# Patient Record
Sex: FEMALE | Race: WHITE | NOT HISPANIC OR LATINO
[De-identification: names, ages, dates, MRNs, and addresses within clinical notes are randomized per-mention and may not be internally consistent; named-entity substitution may affect disease eponyms.]

---

## 2022-12-27 PROBLEM — Z00.00 ENCOUNTER FOR PREVENTIVE HEALTH EXAMINATION: Status: ACTIVE | Noted: 2022-12-27

## 2023-01-03 ENCOUNTER — APPOINTMENT (OUTPATIENT)
Dept: OBGYN | Facility: CLINIC | Age: 28
End: 2023-01-03
Payer: COMMERCIAL

## 2023-01-03 ENCOUNTER — LABORATORY RESULT (OUTPATIENT)
Age: 28
End: 2023-01-03

## 2023-01-03 VITALS
BODY MASS INDEX: 41.22 KG/M2 | HEIGHT: 62 IN | DIASTOLIC BLOOD PRESSURE: 78 MMHG | WEIGHT: 224 LBS | SYSTOLIC BLOOD PRESSURE: 121 MMHG

## 2023-01-03 DIAGNOSIS — Z82.49 FAMILY HISTORY OF ISCHEMIC HEART DISEASE AND OTHER DISEASES OF THE CIRCULATORY SYSTEM: ICD-10-CM

## 2023-01-03 DIAGNOSIS — Z83.79 FAMILY HISTORY OF OTHER DISEASES OF THE DIGESTIVE SYSTEM: ICD-10-CM

## 2023-01-03 PROCEDURE — 36415 COLL VENOUS BLD VENIPUNCTURE: CPT

## 2023-01-03 PROCEDURE — 99202 OFFICE O/P NEW SF 15 MIN: CPT

## 2023-01-03 NOTE — PLAN
[FreeTextEntry1] : 27 year old presenting with amenorrhea \par -f/u PAP and GC/CT done today\par -f/u prenatal blood work drawn today\par -Rx sent for folic acid \par -RTO 2 weeks for first trimester ultrasound and review of prenatal lab results

## 2023-01-03 NOTE — HISTORY OF PRESENT ILLNESS
[FreeTextEntry1] : Patient is a 27 year old who presents after she had a positive home pregnancy test. LMP 11/10/22. She is endorsing occasional nausea and breast tenderness. She is taking OTC PNV that she is happy with. \par  [HIV Test offered] : HIV Test offered [Syphilis test offered] : Syphilis test offered [Gonorrhea test offered] : Gonorrhea test offered [Chlamydia test offered] : Chlamydia test offered

## 2023-01-04 ENCOUNTER — NON-APPOINTMENT (OUTPATIENT)
Age: 28
End: 2023-01-04

## 2023-01-04 LAB
ABO + RH PNL BLD: NORMAL
APPEARANCE: CLEAR
BACTERIA: NEGATIVE
BASOPHILS # BLD AUTO: 0.04 K/UL
BASOPHILS NFR BLD AUTO: 0.4 %
BILIRUBIN URINE: NEGATIVE
BLD GP AB SCN SERPL QL: NORMAL
BLOOD URINE: NEGATIVE
COLOR: YELLOW
EOSINOPHIL # BLD AUTO: 0.21 K/UL
EOSINOPHIL NFR BLD AUTO: 1.9 %
ESTIMATED AVERAGE GLUCOSE: 128 MG/DL
GLUCOSE QUALITATIVE U: NORMAL
GLUCOSE SERPL-MCNC: 83 MG/DL
HBA1C MFR BLD HPLC: 6.1 %
HCT VFR BLD CALC: 39.7 %
HGB A MFR BLD: 97.7 %
HGB A2 MFR BLD: 2.3 %
HGB BLD-MCNC: 13.2 G/DL
HGB FRACT BLD-IMP: NORMAL
HIV1+2 AB SPEC QL IA.RAPID: NONREACTIVE
HPV HIGH+LOW RISK DNA PNL CVX: NOT DETECTED
HYALINE CASTS: 2 /LPF
IMM GRANULOCYTES NFR BLD AUTO: 0.4 %
KETONES URINE: NEGATIVE
LEUKOCYTE ESTERASE URINE: NEGATIVE
LYMPHOCYTES # BLD AUTO: 5.37 K/UL
LYMPHOCYTES NFR BLD AUTO: 49.7 %
MAN DIFF?: NORMAL
MCHC RBC-ENTMCNC: 27.7 PG
MCHC RBC-ENTMCNC: 33.2 GM/DL
MCV RBC AUTO: 83.4 FL
MICROSCOPIC-UA: NORMAL
MONOCYTES # BLD AUTO: 0.81 K/UL
MONOCYTES NFR BLD AUTO: 7.5 %
NEUTROPHILS # BLD AUTO: 4.33 K/UL
NEUTROPHILS NFR BLD AUTO: 40.1 %
NITRITE URINE: NEGATIVE
PH URINE: 6
PLATELET # BLD AUTO: 422 K/UL
PROTEIN URINE: NORMAL
RBC # BLD: 4.76 M/UL
RBC # FLD: 13.5 %
RED BLOOD CELLS URINE: 9 /HPF
SPECIFIC GRAVITY URINE: 1.03
SQUAMOUS EPITHELIAL CELLS: 1 /HPF
UROBILINOGEN URINE: NORMAL
WBC # FLD AUTO: 10.8 K/UL
WHITE BLOOD CELLS URINE: 2 /HPF

## 2023-01-05 LAB
BACTERIA UR CULT: NORMAL
C TRACH RRNA SPEC QL NAA+PROBE: NOT DETECTED
HBV SURFACE AG SER QL: NONREACTIVE
HPV 16 E6+E7 MRNA CVX QL NAA+PROBE: NOT DETECTED
HPV18+45 E6+E7 MRNA CVX QL NAA+PROBE: NOT DETECTED
LEAD BLD-MCNC: <1 UG/DL
MEV IGG FLD QL IA: 14.7 AU/ML
MEV IGG+IGM SER-IMP: NORMAL
MUV AB SER-ACNC: POSITIVE
MUV IGG SER QL IA: 58.7 AU/ML
N GONORRHOEA RRNA SPEC QL NAA+PROBE: NOT DETECTED
RUBV IGG FLD-ACNC: 4.8 INDEX
RUBV IGG SER-IMP: POSITIVE
SOURCE AMPLIFICATION: NORMAL
T PALLIDUM AB SER QL IA: NEGATIVE

## 2023-01-06 LAB
M TB IFN-G BLD-IMP: NEGATIVE
QUANTIFERON TB PLUS MITOGEN MINUS NIL: >10 IU/ML
QUANTIFERON TB PLUS NIL: 0.02 IU/ML
QUANTIFERON TB PLUS TB1 MINUS NIL: 0 IU/ML
QUANTIFERON TB PLUS TB2 MINUS NIL: 0 IU/ML

## 2023-01-08 LAB — AR GENE MUT ANL BLD/T: NORMAL

## 2023-01-11 LAB
CYTOLOGY CVX/VAG DOC THIN PREP: NORMAL
FMR1 GENE MUT ANL BLD/T: NORMAL

## 2023-01-12 LAB — CFTR MUT TESTED BLD/T: NEGATIVE

## 2023-01-18 ENCOUNTER — APPOINTMENT (OUTPATIENT)
Dept: ANTEPARTUM | Facility: CLINIC | Age: 28
End: 2023-01-18
Payer: COMMERCIAL

## 2023-01-18 ENCOUNTER — APPOINTMENT (OUTPATIENT)
Dept: OBGYN | Facility: CLINIC | Age: 28
End: 2023-01-18
Payer: COMMERCIAL

## 2023-01-18 VITALS
WEIGHT: 224 LBS | SYSTOLIC BLOOD PRESSURE: 112 MMHG | DIASTOLIC BLOOD PRESSURE: 74 MMHG | HEIGHT: 62 IN | BODY MASS INDEX: 41.22 KG/M2

## 2023-01-18 PROCEDURE — 76801 OB US < 14 WKS SINGLE FETUS: CPT

## 2023-01-18 PROCEDURE — 0502F SUBSEQUENT PRENATAL CARE: CPT

## 2023-02-01 ENCOUNTER — APPOINTMENT (OUTPATIENT)
Dept: OBGYN | Facility: CLINIC | Age: 28
End: 2023-02-01
Payer: COMMERCIAL

## 2023-02-01 ENCOUNTER — APPOINTMENT (OUTPATIENT)
Dept: ANTEPARTUM | Facility: CLINIC | Age: 28
End: 2023-02-01
Payer: COMMERCIAL

## 2023-02-01 PROCEDURE — 76813 OB US NUCHAL MEAS 1 GEST: CPT

## 2023-02-01 PROCEDURE — 0502F SUBSEQUENT PRENATAL CARE: CPT

## 2023-02-06 LAB
ADDITIONAL US: NORMAL
CRL SCAN TWIN B: NORMAL
CRL SCAN: NORMAL
CROWN RUMP LENGTH TWIN B: NORMAL
CROWN RUMP LENGTH: 59.2 MM
DIA MOM: 0.57
DIA VALUE: 105.6 PG/ML
DOWN SYNDROME AGE RISK: NORMAL
DOWN SYNDROME INTERPRETATION: NORMAL
DOWN SYNDROME SCREENING RISK: NORMAL
FIRST TRIMESTER SCREEN COMMENTS: NORMAL
FIRST TRIMESTER SCREEN NOTE: NORMAL
FIRST TRIMESTER SCREEN RESULTS: NORMAL
FIRST TRIMESTER SCREEN TEST RESULTS: NORMAL
GEST. AGE ON COLLECTION DATE: 12.3 WEEKS
HCG MOM: 1.08
HCG VALUE: 86.3 IU/ML
MATERNAL AGE AT EDD: 29.2 YR
NT MOM TWIN B: NORMAL
NT TWIN B: NORMAL
NUCHAL TRANSLUCENCY (NT): 1.1 MM
NUCHAL TRANSLUCENCY MOM: 0.83
NUMBER OF FETUSES: 1
PAPP-A MOM: 0.62
PAPP-A VALUE: 340.3 NG/ML
RACE: NORMAL
SONOGRAPHER ID#: NORMAL
TRISOMY 18 AGE RISK: NORMAL
TRISOMY 18 INTERPRETATION: NORMAL
TRISOMY 18 SCREENING RISK: NORMAL
WEIGHT AFP: 221 LBS

## 2023-02-23 ENCOUNTER — APPOINTMENT (OUTPATIENT)
Dept: OBGYN | Facility: CLINIC | Age: 28
End: 2023-02-23
Payer: COMMERCIAL

## 2023-02-23 VITALS
WEIGHT: 221 LBS | HEIGHT: 62 IN | DIASTOLIC BLOOD PRESSURE: 85 MMHG | BODY MASS INDEX: 40.67 KG/M2 | SYSTOLIC BLOOD PRESSURE: 123 MMHG

## 2023-02-23 PROCEDURE — 0502F SUBSEQUENT PRENATAL CARE: CPT

## 2023-02-26 LAB
AFP MOM: 1.02
AFP VALUE: 24.7 NG/ML
ALPHA FETOPROTEIN SERUM COMMENT: NORMAL
ALPHA FETOPROTEIN SERUM INTERPRETATION: NORMAL
ALPHA FETOPROTEIN SERUM RESULTS: NORMAL
ALPHA FETOPROTEIN SERUM TEST RESULTS: NORMAL
GESTATIONAL AGE BASED ON: NORMAL
GESTATIONAL AGE ON COLLECTION DATE: 15 WEEKS
INSULIN DEP DIABETES: NO
MATERNAL AGE AT EDD AFP: 29.2 YR
MULTIPLE GESTATION: NO
OSBR RISK 1 IN: NORMAL
RACE: NORMAL
WEIGHT AFP: 221 LBS

## 2023-03-08 ENCOUNTER — APPOINTMENT (OUTPATIENT)
Dept: OBGYN | Facility: CLINIC | Age: 28
End: 2023-03-08
Payer: COMMERCIAL

## 2023-03-08 VITALS — WEIGHT: 219 LBS | DIASTOLIC BLOOD PRESSURE: 80 MMHG | BODY MASS INDEX: 40.06 KG/M2 | SYSTOLIC BLOOD PRESSURE: 115 MMHG

## 2023-03-08 DIAGNOSIS — Z33.1 PREGNANT STATE, INCIDENTAL: ICD-10-CM

## 2023-03-08 PROCEDURE — 0502F SUBSEQUENT PRENATAL CARE: CPT

## 2023-03-22 ENCOUNTER — APPOINTMENT (OUTPATIENT)
Dept: OBGYN | Facility: CLINIC | Age: 28
End: 2023-03-22
Payer: COMMERCIAL

## 2023-03-22 VITALS — DIASTOLIC BLOOD PRESSURE: 83 MMHG | WEIGHT: 218 LBS | SYSTOLIC BLOOD PRESSURE: 116 MMHG | BODY MASS INDEX: 39.87 KG/M2

## 2023-03-22 PROCEDURE — 0502F SUBSEQUENT PRENATAL CARE: CPT

## 2023-04-04 ENCOUNTER — APPOINTMENT (OUTPATIENT)
Dept: PEDIATRIC CARDIOLOGY | Facility: CLINIC | Age: 28
End: 2023-04-04
Payer: COMMERCIAL

## 2023-04-04 PROCEDURE — 99203 OFFICE O/P NEW LOW 30 MIN: CPT | Mod: 25

## 2023-04-04 PROCEDURE — 93325 DOPPLER ECHO COLOR FLOW MAPG: CPT | Mod: 59

## 2023-04-04 PROCEDURE — 76827 ECHO EXAM OF FETAL HEART: CPT

## 2023-04-04 PROCEDURE — 76820 UMBILICAL ARTERY ECHO: CPT

## 2023-04-04 PROCEDURE — 76821 MIDDLE CEREBRAL ARTERY ECHO: CPT

## 2023-04-04 PROCEDURE — 76825 ECHO EXAM OF FETAL HEART: CPT

## 2023-04-12 ENCOUNTER — APPOINTMENT (OUTPATIENT)
Dept: ANTEPARTUM | Facility: CLINIC | Age: 28
End: 2023-04-12
Payer: COMMERCIAL

## 2023-04-12 ENCOUNTER — APPOINTMENT (OUTPATIENT)
Dept: OBGYN | Facility: CLINIC | Age: 28
End: 2023-04-12
Payer: COMMERCIAL

## 2023-04-12 VITALS — BODY MASS INDEX: 40.42 KG/M2 | SYSTOLIC BLOOD PRESSURE: 127 MMHG | DIASTOLIC BLOOD PRESSURE: 82 MMHG | WEIGHT: 221 LBS

## 2023-04-12 PROCEDURE — 0502F SUBSEQUENT PRENATAL CARE: CPT

## 2023-04-12 PROCEDURE — 76811 OB US DETAILED SNGL FETUS: CPT

## 2023-04-28 ENCOUNTER — NON-APPOINTMENT (OUTPATIENT)
Age: 28
End: 2023-04-28

## 2023-05-03 ENCOUNTER — APPOINTMENT (OUTPATIENT)
Dept: OBGYN | Facility: CLINIC | Age: 28
End: 2023-05-03
Payer: COMMERCIAL

## 2023-05-03 VITALS — BODY MASS INDEX: 41.15 KG/M2 | SYSTOLIC BLOOD PRESSURE: 124 MMHG | DIASTOLIC BLOOD PRESSURE: 86 MMHG | WEIGHT: 225 LBS

## 2023-05-03 PROCEDURE — 0502F SUBSEQUENT PRENATAL CARE: CPT

## 2023-05-04 LAB
BASOPHILS # BLD AUTO: 0.05 K/UL
BASOPHILS NFR BLD AUTO: 0.3 %
EOSINOPHIL # BLD AUTO: 0.44 K/UL
EOSINOPHIL NFR BLD AUTO: 3 %
HCT VFR BLD CALC: 35.1 %
HGB BLD-MCNC: 11.4 G/DL
IMM GRANULOCYTES NFR BLD AUTO: 1.3 %
LYMPHOCYTES # BLD AUTO: 4.6 K/UL
LYMPHOCYTES NFR BLD AUTO: 31.8 %
MAN DIFF?: NORMAL
MCHC RBC-ENTMCNC: 27.7 PG
MCHC RBC-ENTMCNC: 32.5 GM/DL
MCV RBC AUTO: 85.4 FL
MONOCYTES # BLD AUTO: 1 K/UL
MONOCYTES NFR BLD AUTO: 6.9 %
NEUTROPHILS # BLD AUTO: 8.2 K/UL
NEUTROPHILS NFR BLD AUTO: 56.7 %
PLATELET # BLD AUTO: 378 K/UL
RBC # BLD: 4.11 M/UL
RBC # FLD: 13.6 %
WBC # FLD AUTO: 14.48 K/UL

## 2023-05-30 ENCOUNTER — APPOINTMENT (OUTPATIENT)
Dept: OBGYN | Facility: CLINIC | Age: 28
End: 2023-05-30
Payer: COMMERCIAL

## 2023-05-30 ENCOUNTER — APPOINTMENT (OUTPATIENT)
Dept: ANTEPARTUM | Facility: CLINIC | Age: 28
End: 2023-05-30
Payer: COMMERCIAL

## 2023-05-30 VITALS — DIASTOLIC BLOOD PRESSURE: 83 MMHG | SYSTOLIC BLOOD PRESSURE: 122 MMHG | BODY MASS INDEX: 41.15 KG/M2 | WEIGHT: 225 LBS

## 2023-05-30 PROCEDURE — 76819 FETAL BIOPHYS PROFIL W/O NST: CPT

## 2023-05-30 PROCEDURE — 76816 OB US FOLLOW-UP PER FETUS: CPT

## 2023-05-30 PROCEDURE — 0502F SUBSEQUENT PRENATAL CARE: CPT

## 2023-06-06 ENCOUNTER — APPOINTMENT (OUTPATIENT)
Dept: OBGYN | Facility: CLINIC | Age: 28
End: 2023-06-06
Payer: COMMERCIAL

## 2023-06-06 VITALS — DIASTOLIC BLOOD PRESSURE: 84 MMHG | SYSTOLIC BLOOD PRESSURE: 123 MMHG

## 2023-06-06 VITALS — WEIGHT: 225 LBS | BODY MASS INDEX: 41.15 KG/M2

## 2023-06-06 DIAGNOSIS — O24.419 GESTATIONAL DIABETES MELLITUS IN PREGNANCY, UNSPECIFIED CONTROL: ICD-10-CM

## 2023-06-06 PROCEDURE — 0502F SUBSEQUENT PRENATAL CARE: CPT

## 2023-06-15 ENCOUNTER — NON-APPOINTMENT (OUTPATIENT)
Age: 28
End: 2023-06-15

## 2023-06-15 ENCOUNTER — APPOINTMENT (OUTPATIENT)
Dept: ANTEPARTUM | Facility: CLINIC | Age: 28
End: 2023-06-15

## 2023-06-15 ENCOUNTER — APPOINTMENT (OUTPATIENT)
Dept: ANTEPARTUM | Facility: CLINIC | Age: 28
End: 2023-06-15
Payer: COMMERCIAL

## 2023-06-15 ENCOUNTER — APPOINTMENT (OUTPATIENT)
Dept: OBGYN | Facility: CLINIC | Age: 28
End: 2023-06-15
Payer: COMMERCIAL

## 2023-06-15 VITALS
BODY MASS INDEX: 41.22 KG/M2 | HEIGHT: 62 IN | WEIGHT: 224 LBS | SYSTOLIC BLOOD PRESSURE: 121 MMHG | DIASTOLIC BLOOD PRESSURE: 79 MMHG

## 2023-06-15 PROCEDURE — 76818 FETAL BIOPHYS PROFILE W/NST: CPT

## 2023-06-15 PROCEDURE — 76816 OB US FOLLOW-UP PER FETUS: CPT | Mod: 59

## 2023-06-15 PROCEDURE — 0502F SUBSEQUENT PRENATAL CARE: CPT

## 2023-06-21 ENCOUNTER — APPOINTMENT (OUTPATIENT)
Dept: OBGYN | Facility: CLINIC | Age: 28
End: 2023-06-21
Payer: COMMERCIAL

## 2023-06-21 VITALS
SYSTOLIC BLOOD PRESSURE: 111 MMHG | BODY MASS INDEX: 42.14 KG/M2 | WEIGHT: 229 LBS | DIASTOLIC BLOOD PRESSURE: 75 MMHG | HEIGHT: 62 IN

## 2023-06-21 PROCEDURE — 0502F SUBSEQUENT PRENATAL CARE: CPT

## 2023-07-05 ENCOUNTER — APPOINTMENT (OUTPATIENT)
Dept: ANTEPARTUM | Facility: CLINIC | Age: 28
End: 2023-07-05
Payer: COMMERCIAL

## 2023-07-05 ENCOUNTER — INPATIENT (INPATIENT)
Facility: HOSPITAL | Age: 28
LOS: 0 days | Discharge: HOME | End: 2023-07-06
Attending: OBSTETRICS & GYNECOLOGY | Admitting: OBSTETRICS & GYNECOLOGY
Payer: COMMERCIAL

## 2023-07-05 ENCOUNTER — APPOINTMENT (OUTPATIENT)
Dept: OBGYN | Facility: CLINIC | Age: 28
End: 2023-07-05
Payer: COMMERCIAL

## 2023-07-05 VITALS
DIASTOLIC BLOOD PRESSURE: 83 MMHG | WEIGHT: 233.5 LBS | HEART RATE: 112 BPM | SYSTOLIC BLOOD PRESSURE: 124 MMHG | BODY MASS INDEX: 42.71 KG/M2

## 2023-07-05 VITALS
SYSTOLIC BLOOD PRESSURE: 123 MMHG | DIASTOLIC BLOOD PRESSURE: 73 MMHG | RESPIRATION RATE: 16 BRPM | HEART RATE: 85 BPM | TEMPERATURE: 98 F

## 2023-07-05 DIAGNOSIS — O26.899 OTHER SPECIFIED PREGNANCY RELATED CONDITIONS, UNSPECIFIED TRIMESTER: ICD-10-CM

## 2023-07-05 PROCEDURE — 76820 UMBILICAL ARTERY ECHO: CPT | Mod: 59

## 2023-07-05 PROCEDURE — 0502F SUBSEQUENT PRENATAL CARE: CPT

## 2023-07-05 PROCEDURE — 76816 OB US FOLLOW-UP PER FETUS: CPT

## 2023-07-05 PROCEDURE — 76819 FETAL BIOPHYS PROFIL W/O NST: CPT | Mod: 59

## 2023-07-05 PROCEDURE — 99222 1ST HOSP IP/OBS MODERATE 55: CPT

## 2023-07-05 RX ORDER — INSULIN GLARGINE 100 [IU]/ML
28 INJECTION, SOLUTION SUBCUTANEOUS EVERY MORNING
Refills: 0 | Status: DISCONTINUED | OUTPATIENT
Start: 2023-07-06 | End: 2023-07-06

## 2023-07-05 RX ORDER — INSULIN GLARGINE 100 [IU]/ML
45 INJECTION, SOLUTION SUBCUTANEOUS AT BEDTIME
Refills: 0 | Status: DISCONTINUED | OUTPATIENT
Start: 2023-07-06 | End: 2023-07-06

## 2023-07-05 RX ORDER — DEXTROSE 50 % IN WATER 50 %
25 SYRINGE (ML) INTRAVENOUS ONCE
Refills: 0 | Status: DISCONTINUED | OUTPATIENT
Start: 2023-07-05 | End: 2023-07-06

## 2023-07-05 RX ORDER — INSULIN LISPRO 100/ML
10 VIAL (ML) SUBCUTANEOUS
Refills: 0 | Status: DISCONTINUED | OUTPATIENT
Start: 2023-07-06 | End: 2023-07-06

## 2023-07-05 RX ORDER — INSULIN LISPRO 100/ML
12 VIAL (ML) SUBCUTANEOUS
Refills: 0 | Status: DISCONTINUED | OUTPATIENT
Start: 2023-07-06 | End: 2023-07-06

## 2023-07-05 RX ORDER — DEXTROSE 50 % IN WATER 50 %
15 SYRINGE (ML) INTRAVENOUS ONCE
Refills: 0 | Status: DISCONTINUED | OUTPATIENT
Start: 2023-07-05 | End: 2023-07-06

## 2023-07-05 RX ORDER — SODIUM CHLORIDE 9 MG/ML
1000 INJECTION, SOLUTION INTRAVENOUS
Refills: 0 | Status: DISCONTINUED | OUTPATIENT
Start: 2023-07-05 | End: 2023-07-06

## 2023-07-05 RX ORDER — LEVETIRACETAM 250 MG/1
1000 TABLET, FILM COATED ORAL
Refills: 0 | Status: DISCONTINUED | OUTPATIENT
Start: 2023-07-05 | End: 2023-07-05

## 2023-07-05 RX ORDER — INSULIN LISPRO 100/ML
8 VIAL (ML) SUBCUTANEOUS
Refills: 0 | Status: DISCONTINUED | OUTPATIENT
Start: 2023-07-06 | End: 2023-07-06

## 2023-07-05 NOTE — OB PROVIDER H&P - NSHPLABSRESULTS_GEN_ALL_CORE
FHT - 140's; +variability; occ irregular CTX, mild, Overall FHT reactive    Sono - VTX/RUBY - 9.8  BPP 8/8

## 2023-07-05 NOTE — OB PROVIDER H&P - HISTORY OF PRESENT ILLNESS
27 y/o  EGA 33+6 weeks sent by PMD for observation due to RUBY = 2 in office and EFW 7+ 27 y/o  EGA 33+6 weeks sent by PMD for observation due to RUBY = 2 in office and EFW 7+  PNC comp by GDM A2 on insulin    PMH - not sig except as above  POb - not sig except as above  PSH - recent broken left foot  Meds - Insulin; Levemir; Baby ASA; Vits

## 2023-07-06 ENCOUNTER — TRANSCRIPTION ENCOUNTER (OUTPATIENT)
Age: 28
End: 2023-07-06

## 2023-07-06 ENCOUNTER — ASOB RESULT (OUTPATIENT)
Age: 28
End: 2023-07-06

## 2023-07-06 ENCOUNTER — APPOINTMENT (OUTPATIENT)
Dept: ANTEPARTUM | Facility: CLINIC | Age: 28
End: 2023-07-06
Payer: COMMERCIAL

## 2023-07-06 VITALS — HEART RATE: 103 BPM | OXYGEN SATURATION: 98 %

## 2023-07-06 LAB
ALBUMIN SERPL ELPH-MCNC: 3.4 G/DL — SIGNIFICANT CHANGE UP (ref 3.3–5)
ALP SERPL-CCNC: 127 U/L — HIGH (ref 40–120)
ALT FLD-CCNC: 9 U/L — SIGNIFICANT CHANGE UP (ref 4–33)
ANION GAP SERPL CALC-SCNC: 13 MMOL/L — SIGNIFICANT CHANGE UP (ref 7–14)
APPEARANCE UR: CLEAR — SIGNIFICANT CHANGE UP
AST SERPL-CCNC: 15 U/L — SIGNIFICANT CHANGE UP (ref 4–32)
BASOPHILS # BLD AUTO: 0.03 K/UL — SIGNIFICANT CHANGE UP (ref 0–0.2)
BASOPHILS NFR BLD AUTO: 0.3 % — SIGNIFICANT CHANGE UP (ref 0–2)
BILIRUB SERPL-MCNC: <0.2 MG/DL — SIGNIFICANT CHANGE UP (ref 0.2–1.2)
BILIRUB UR-MCNC: NEGATIVE — SIGNIFICANT CHANGE UP
BUN SERPL-MCNC: 6 MG/DL — LOW (ref 7–23)
CALCIUM SERPL-MCNC: 9.3 MG/DL — SIGNIFICANT CHANGE UP (ref 8.4–10.5)
CHLORIDE SERPL-SCNC: 105 MMOL/L — SIGNIFICANT CHANGE UP (ref 98–107)
CO2 SERPL-SCNC: 19 MMOL/L — LOW (ref 22–31)
COLOR SPEC: YELLOW — SIGNIFICANT CHANGE UP
CREAT SERPL-MCNC: 0.35 MG/DL — LOW (ref 0.5–1.3)
DIFF PNL FLD: NEGATIVE — SIGNIFICANT CHANGE UP
EGFR: 142 ML/MIN/1.73M2 — SIGNIFICANT CHANGE UP
EOSINOPHIL # BLD AUTO: 0.21 K/UL — SIGNIFICANT CHANGE UP (ref 0–0.5)
EOSINOPHIL NFR BLD AUTO: 2.1 % — SIGNIFICANT CHANGE UP (ref 0–6)
GLUCOSE BLDC GLUCOMTR-MCNC: 100 MG/DL — HIGH (ref 70–99)
GLUCOSE BLDC GLUCOMTR-MCNC: 124 MG/DL — HIGH (ref 70–99)
GLUCOSE SERPL-MCNC: 91 MG/DL — SIGNIFICANT CHANGE UP (ref 70–99)
GLUCOSE UR QL: 100 MG/DL
HCT VFR BLD CALC: 33.2 % — LOW (ref 34.5–45)
HGB BLD-MCNC: 10.7 G/DL — LOW (ref 11.5–15.5)
IANC: 5.5 K/UL — SIGNIFICANT CHANGE UP (ref 1.8–7.4)
IMM GRANULOCYTES NFR BLD AUTO: 1.3 % — HIGH (ref 0–0.9)
KETONES UR-MCNC: ABNORMAL MG/DL
LEUKOCYTE ESTERASE UR-ACNC: NEGATIVE — SIGNIFICANT CHANGE UP
LYMPHOCYTES # BLD AUTO: 3.16 K/UL — SIGNIFICANT CHANGE UP (ref 1–3.3)
LYMPHOCYTES # BLD AUTO: 32 % — SIGNIFICANT CHANGE UP (ref 13–44)
MCHC RBC-ENTMCNC: 27 PG — SIGNIFICANT CHANGE UP (ref 27–34)
MCHC RBC-ENTMCNC: 32.2 GM/DL — SIGNIFICANT CHANGE UP (ref 32–36)
MCV RBC AUTO: 83.8 FL — SIGNIFICANT CHANGE UP (ref 80–100)
MONOCYTES # BLD AUTO: 0.86 K/UL — SIGNIFICANT CHANGE UP (ref 0–0.9)
MONOCYTES NFR BLD AUTO: 8.7 % — SIGNIFICANT CHANGE UP (ref 2–14)
NEUTROPHILS # BLD AUTO: 5.5 K/UL — SIGNIFICANT CHANGE UP (ref 1.8–7.4)
NEUTROPHILS NFR BLD AUTO: 55.6 % — SIGNIFICANT CHANGE UP (ref 43–77)
NITRITE UR-MCNC: NEGATIVE — SIGNIFICANT CHANGE UP
NRBC # BLD: 0 /100 WBCS — SIGNIFICANT CHANGE UP (ref 0–0)
NRBC # FLD: 0 K/UL — SIGNIFICANT CHANGE UP (ref 0–0)
PH UR: 6 — SIGNIFICANT CHANGE UP (ref 5–8)
PLATELET # BLD AUTO: 313 K/UL — SIGNIFICANT CHANGE UP (ref 150–400)
POTASSIUM SERPL-MCNC: 3.8 MMOL/L — SIGNIFICANT CHANGE UP (ref 3.5–5.3)
POTASSIUM SERPL-SCNC: 3.8 MMOL/L — SIGNIFICANT CHANGE UP (ref 3.5–5.3)
PROT SERPL-MCNC: 6.4 G/DL — SIGNIFICANT CHANGE UP (ref 6–8.3)
PROT UR-MCNC: SIGNIFICANT CHANGE UP MG/DL
RBC # BLD: 3.96 M/UL — SIGNIFICANT CHANGE UP (ref 3.8–5.2)
RBC # FLD: 13.9 % — SIGNIFICANT CHANGE UP (ref 10.3–14.5)
RH IG SCN BLD-IMP: POSITIVE — SIGNIFICANT CHANGE UP
SODIUM SERPL-SCNC: 137 MMOL/L — SIGNIFICANT CHANGE UP (ref 135–145)
SP GR SPEC: 1.02 — SIGNIFICANT CHANGE UP (ref 1–1.03)
UROBILINOGEN FLD QL: 0.2 MG/DL — SIGNIFICANT CHANGE UP (ref 0.2–1)
WBC # BLD: 9.89 K/UL — SIGNIFICANT CHANGE UP (ref 3.8–10.5)
WBC # FLD AUTO: 9.89 K/UL — SIGNIFICANT CHANGE UP (ref 3.8–10.5)

## 2023-07-06 PROCEDURE — 76811 OB US DETAILED SNGL FETUS: CPT | Mod: 26

## 2023-07-06 PROCEDURE — 76819 FETAL BIOPHYS PROFIL W/O NST: CPT | Mod: 26

## 2023-07-06 RX ADMIN — INSULIN GLARGINE 28 UNIT(S): 100 INJECTION, SOLUTION SUBCUTANEOUS at 08:05

## 2023-07-06 RX ADMIN — Medication 8 UNIT(S): at 08:05

## 2023-07-06 NOTE — DISCHARGE NOTE ANTEPARTUM - MEDICATION SUMMARY - MEDICATIONS TO TAKE
I will START or STAY ON the medications listed below when I get home from the hospital:    aspirin 81 mg oral capsule  -- 1 orally  -- Indication: For ASA    HumaLOG 100 units/mL injectable solution  -- 10 unit(s) injectable once a day after lunch  -- Indication: For A2    HumaLOG 100 units/mL injectable solution  -- 8 unit(s) injectable once a day after breakfast  -- Indication: For A2    Levemir 100 units/mL subcutaneous solution  -- 28 unit(s) subcutaneous once a day (in the morning)  -- Indication: For A2    HumaLOG 100 units/mL injectable solution  -- 12 unit(s) injectable once a day after dinner  -- Indication: For A2    Levemir 100 units/mL subcutaneous solution  -- 45 unit(s) subcutaneous once a day (in the evening)  -- Indication: For A2    PNV Prenatal oral tablet  -- 1 orally  -- Indication: For pregnancy

## 2023-07-06 NOTE — DISCHARGE NOTE ANTEPARTUM - HOSPITAL COURSE
29yo  @34+0 poorly controlled GDMA2 a/w oligo in office (RUBY 2) with normal RUBY in triage (9.8). Patient and fetus stable overnight. Pt with GDMA2 c/w Lantus 45 at night and 28u in morning. Admelog 8/10/12 with meals  Repeat ATU sonogram this morning /6 Cephalic/ RUBY 9 EFW 2883 grams (94%). Pt overall doing well and stable for discharge home with close outpatient follow up.

## 2023-07-06 NOTE — DISCHARGE NOTE ANTEPARTUM - CARE PROVIDER_API CALL
Tyson Burk  Maternal/Fetal Medicine  [address], [address]  [place] [address]  Phone: [phone]  Fax: [phone]  Follow Up Time:

## 2023-07-06 NOTE — OB RN PATIENT PROFILE - FALL HARM RISK - UNIVERSAL INTERVENTIONS
Bed in lowest position, wheels locked, appropriate side rails in place/Call bell, personal items and telephone in reach/Instruct patient to call for assistance before getting out of bed or chair/Non-slip footwear when patient is out of bed/[place] to call system/Physically safe environment - no spills, clutter or unnecessary equipment/Purposeful Proactive Rounding/Room/bathroom lighting operational, light cord in reach

## 2023-07-06 NOTE — DISCHARGE NOTE ANTEPARTUM - CARE PLAN
. 1 Principal Discharge DX:	Follow-up for low amniotic fluid volume,   Assessment and plan of treatment:	- Follow up with OB Dr Burk within one week  - Return with contractions, vaginal bleeding, leaking fluid or decreased fetal movement  - continue glucose monitoring/logging and Insulin regimen

## 2023-07-06 NOTE — PROGRESS NOTE ADULT - ASSESSMENT
29yo  @34+0 poorly controlled GDMA2 a/w oligo in office (RUBY 2) with normal RUBY in triage (9.8). Patient and fetus stable overnight.     #GDMA2  - FS elevated overnight to 124, ctm  - c/w Lantus 45 at night and 28u in morning. Admelog 8/10/12 with meals    #?Oligo  - f/u ATU sono this AM    #Maternal wellbeing  - CLD  - Consider HSQ if prolonged hospitalization    #Fetal wellbeing  - Continuous monitoring    CHAY Chen, PGY3

## 2023-07-06 NOTE — DISCHARGE NOTE ANTEPARTUM - PLAN OF CARE
- Follow up with OB Dr Burk within one week  - Return with contractions, vaginal bleeding, leaking fluid or decreased fetal movement  - continue glucose monitoring/logging and Insulin regimen

## 2023-07-06 NOTE — DISCHARGE NOTE ANTEPARTUM - NS MD DC FALL RISK RISK
For information on Fall & Injury Prevention, visit: [email or web address] OR  [email or web address] OR  https://www.cdc.gov/steadi/patient.html

## 2023-07-06 NOTE — OB RN PATIENT PROFILE - NS MD HP INPLANTS MED DEV
Postpartum Progress Note     Noa Jordan 34 year old  s/pnsvd PPD#1 Passing gas, + bowel movements. WBC elevated probably secondary to GBS positive.  Notified and we will repeat cbc.    Denies fever, chills, headache, vision changes, dyspnea, chest pain, RUQ/epigastric pain, or lower extremity pain.    PHYSICAL EXAM  Visit Vitals  /75   Pulse 79   Temp 98.1 °F (36.7 °C) (Oral)   Resp 20   Ht 5' 6\" (1.676 m)   Wt 106 kg   LMP  (LMP Unknown)   SpO2 100%   Breastfeeding No   BMI 37.72 kg/m²      BP  Min: 119/75  Max: 138/98  Temp  Av.2 °F (36.8 °C)  Min: 98.1 °F (36.7 °C)  Max: 98.2 °F (36.8 °C)  Pulse  Av  Min: 79  Max: 85  SpO2  Av %  Min: 100 %  Max: 100 %  Gen: Alert, normal affect, no apparent distress.  Skin: Normal color, texture, turgor, no rashes/bruises/active lesions.  Lungs: No increased work of respiration, lungs clear to auscultation bilaterally.  Heart: Regular rate.  Normal peripheral pulses.  Abd: Soft, non-tender, uterine fundus firm and 2 finger-breadths below the umbilicus; normoactive bowel sounds.   Extr: No cyanosis,neg edema; normal muscle tone and development bilaterally.  :  MODERATE  lochia rubra    Labs:   Recent Labs   Lab 01/15/22  1245   HGB 11.8*   HCT 35.3*            ASSESSMENT/PLAN:  Noa Jordan 34 year old  s/pNSVD PPD#1. Afebrile. Stable.  Pain controlled.     There is no problem list on file for this patient.    Past Medical History:   Diagnosis Date   • Essential hypertension in pregnancy    • Mitral valve regurgitation     per records 2021, EF 65%, mitral valve: mild regurgitation   • No pertinent past medical history        1. Postpartum care:   1. Encourage ambulation.  2. Pain well controlled with oral medication   3. Regular diet.  4. A-SYMPTOMATIC Anemia. Continue Iron Supplementation  5. Rh positive  6. Immunizations: Tdap, flu vaccine - not given during antepartum care.  2. Postpartum Contraception Plan:  1. Desires PP  tubal ligation  3. Repeat cbc  4. male Infant: Providing FORMULA MILK:DESIRES circumcision.  5. Disposition: Anticipate discharge home on PPD #2.  Follow up with Mary Ann Sanchez MD in 3 weeks.    Mary Ann Sanchez MD   1/14/22    None

## 2023-07-06 NOTE — DISCHARGE NOTE ANTEPARTUM - PATIENT PORTAL LINK FT
You can access the FollowMyHealth Patient Portal offered by [facility] by registering at the following website: [email or web address]. By joining [employer]’s FollowMyHealth portal, you will also be able to view your health information using other applications (apps) compatible with our system.

## 2023-07-06 NOTE — PROGRESS NOTE ADULT - SUBJECTIVE AND OBJECTIVE BOX
Patient seen and examined at bedside, no acute overnight events. No acute complaints. Patient endorses good fetal movement. Patient is ambulating and tolerating regular diet. Denies CP, SOB, N/V, fevers, chills, or any other concerns.    Vital Signs Last 24 Hours  T(C): 36.7 (07-06-23 @ 05:57), Max: 36.8 (07-06-23 @ 01:22)  HR: 103 (07-06-23 @ 07:06) (85 - 154)  BP: 108/61 (07-06-23 @ 05:57) (108/61 - 131/77)  RR: 18 (07-06-23 @ 05:57) (16 - 18)  SpO2: 98% (07-06-23 @ 07:06) (85% - 100%)    I&O's Summary      Physical Exam:  General: NAD  CV: RR  Lungs: breathing comfortably on RA  Abdomen: soft, gravid, non-tender  Ext: no pain or swelling    Labs:             10.7<L>  9.89  )-----------( 313      ( 07-06 @ 00:01 )             33.2<L>        MEDICATIONS  (STANDING):  dextrose 50% Injectable 25 Gram(s) IV Push once  insulin glargine Injectable (LANTUS) 28 Unit(s) SubCutaneous every morning  insulin glargine Injectable (LANTUS) 45 Unit(s) SubCutaneous at bedtime  insulin lispro Injectable (ADMELOG) 8 Unit(s) SubCutaneous before breakfast  insulin lispro Injectable (ADMELOG) 10 Unit(s) SubCutaneous before lunch  insulin lispro Injectable (ADMELOG) 12 Unit(s) SubCutaneous before dinner    MEDICATIONS  (PRN):  dextrose Oral Gel 15 Gram(s) Oral once PRN Blood Glucose LESS THAN 70 milliGRAM(s)/deciliter

## 2023-07-10 ENCOUNTER — NON-APPOINTMENT (OUTPATIENT)
Age: 28
End: 2023-07-10

## 2023-07-10 ENCOUNTER — APPOINTMENT (OUTPATIENT)
Dept: ANTEPARTUM | Facility: CLINIC | Age: 28
End: 2023-07-10
Payer: COMMERCIAL

## 2023-07-10 ENCOUNTER — APPOINTMENT (OUTPATIENT)
Dept: OBGYN | Facility: CLINIC | Age: 28
End: 2023-07-10
Payer: COMMERCIAL

## 2023-07-10 VITALS — SYSTOLIC BLOOD PRESSURE: 124 MMHG | WEIGHT: 233 LBS | BODY MASS INDEX: 42.62 KG/M2 | DIASTOLIC BLOOD PRESSURE: 85 MMHG

## 2023-07-10 PROBLEM — S92.909A UNSPECIFIED FRACTURE OF UNSPECIFIED FOOT, INITIAL ENCOUNTER FOR CLOSED FRACTURE: Chronic | Status: ACTIVE | Noted: 2023-07-05

## 2023-07-10 PROCEDURE — 76818 FETAL BIOPHYS PROFILE W/NST: CPT

## 2023-07-10 PROCEDURE — 0502F SUBSEQUENT PRENATAL CARE: CPT

## 2023-07-13 ENCOUNTER — APPOINTMENT (OUTPATIENT)
Dept: ANTEPARTUM | Facility: CLINIC | Age: 28
End: 2023-07-13
Payer: COMMERCIAL

## 2023-07-13 ENCOUNTER — APPOINTMENT (OUTPATIENT)
Dept: OBGYN | Facility: CLINIC | Age: 28
End: 2023-07-13
Payer: COMMERCIAL

## 2023-07-13 VITALS
WEIGHT: 238 LBS | SYSTOLIC BLOOD PRESSURE: 100 MMHG | HEIGHT: 62 IN | DIASTOLIC BLOOD PRESSURE: 69 MMHG | BODY MASS INDEX: 43.79 KG/M2

## 2023-07-13 PROCEDURE — 0502F SUBSEQUENT PRENATAL CARE: CPT

## 2023-07-13 PROCEDURE — 76818 FETAL BIOPHYS PROFILE W/NST: CPT

## 2023-07-17 ENCOUNTER — APPOINTMENT (OUTPATIENT)
Dept: ANTEPARTUM | Facility: CLINIC | Age: 28
End: 2023-07-17
Payer: COMMERCIAL

## 2023-07-17 ENCOUNTER — APPOINTMENT (OUTPATIENT)
Dept: OBGYN | Facility: CLINIC | Age: 28
End: 2023-07-17
Payer: COMMERCIAL

## 2023-07-17 VITALS — WEIGHT: 237 LBS | BODY MASS INDEX: 43.35 KG/M2 | SYSTOLIC BLOOD PRESSURE: 117 MMHG | DIASTOLIC BLOOD PRESSURE: 78 MMHG

## 2023-07-17 PROCEDURE — 76818 FETAL BIOPHYS PROFILE W/NST: CPT

## 2023-07-17 PROCEDURE — 0502F SUBSEQUENT PRENATAL CARE: CPT

## 2023-07-20 ENCOUNTER — APPOINTMENT (OUTPATIENT)
Dept: ANTEPARTUM | Facility: CLINIC | Age: 28
End: 2023-07-20
Payer: COMMERCIAL

## 2023-07-20 ENCOUNTER — APPOINTMENT (OUTPATIENT)
Dept: OBGYN | Facility: CLINIC | Age: 28
End: 2023-07-20
Payer: COMMERCIAL

## 2023-07-20 ENCOUNTER — OUTPATIENT (OUTPATIENT)
Dept: INPATIENT UNIT | Facility: HOSPITAL | Age: 28
LOS: 1 days | Discharge: HOME | End: 2023-07-20
Payer: COMMERCIAL

## 2023-07-20 ENCOUNTER — ASOB RESULT (OUTPATIENT)
Age: 28
End: 2023-07-20

## 2023-07-20 VITALS
SYSTOLIC BLOOD PRESSURE: 123 MMHG | RESPIRATION RATE: 16 BRPM | TEMPERATURE: 98 F | DIASTOLIC BLOOD PRESSURE: 62 MMHG | HEART RATE: 96 BPM

## 2023-07-20 VITALS — DIASTOLIC BLOOD PRESSURE: 79 MMHG | SYSTOLIC BLOOD PRESSURE: 117 MMHG | WEIGHT: 238 LBS | BODY MASS INDEX: 43.53 KG/M2

## 2023-07-20 VITALS — DIASTOLIC BLOOD PRESSURE: 69 MMHG | HEART RATE: 90 BPM | SYSTOLIC BLOOD PRESSURE: 115 MMHG

## 2023-07-20 DIAGNOSIS — O26.899 OTHER SPECIFIED PREGNANCY RELATED CONDITIONS, UNSPECIFIED TRIMESTER: ICD-10-CM

## 2023-07-20 LAB — AMNISURE ROM (RUPTURE OF MEMBRANES): NEGATIVE — SIGNIFICANT CHANGE UP

## 2023-07-20 PROCEDURE — 76818 FETAL BIOPHYS PROFILE W/NST: CPT

## 2023-07-20 PROCEDURE — 0502F SUBSEQUENT PRENATAL CARE: CPT

## 2023-07-20 PROCEDURE — 99221 1ST HOSP IP/OBS SF/LOW 40: CPT | Mod: 25

## 2023-07-20 PROCEDURE — 76819 FETAL BIOPHYS PROFIL W/O NST: CPT | Mod: 26

## 2023-07-20 PROCEDURE — 83986 ASSAY PH BODY FLUID NOS: CPT | Mod: QW

## 2023-07-20 RX ORDER — INSULIN DETEMIR 100/ML (3)
45 INSULIN PEN (ML) SUBCUTANEOUS
Refills: 0 | DISCHARGE

## 2023-07-20 NOTE — OB PROVIDER TRIAGE NOTE - NS_FETALMOVEMENT_OBGYN_ALL_OB
"SUBJECTIVE:                                                      Janis Madison is a 2 week old female, here for a routine health maintenance visit.    Patient was roomed by: Amadeo Garibay    [facility] Child     Social History  Patient accompanied by:  Mother  Questions or concerns?: YES (multiple concerns )    Forms to complete? No  Child lives with::  Mother, father and sister  Who takes care of your child?:  Home with family member  Languages spoken in the home:  English and OTHER*  Recent family changes/ special stressors?:  Recent birth of a baby    Safety / Health Risk  Is your child around anyone who smokes?  No    TB Exposure:     No TB exposure    Car seat < 6 years old, in  back seat, rear-facing, 5-point restraint? Yes    Home Safety Survey:      Firearms in the home?: No      Hearing / Vision  Hearing or vision concerns?  No concerns, hearing and vision subjectively normal    Daily Activities    Water source:  City water  Nutrition:  Breastmilk  Breastfeeding concerns?  None, breastfeeding Message from Joaquin Carolina MD sent at 11/30/2021  1:00 PM CST -----  Holter shows PVCs as suspected.  These are primarily isolated.  He does have ejection fraction of 45% with inferolateral infarct.  He is on Toprol-XL 50 daily.  I would recommend further risk stratification with an exercise myocardial perfusion stress test.  If there are no high risk findings than treat medically.  I might consider consultation electrophysiology    Called patient and reviewed above results. Patient verbalized understanding and agreeable with scheduling stress test. Review pre-stress test instructions. No caffeine 24 hours prior. No metoprolol morning dose day of the test.   "circumference-for-age data using vitals from Freya Torres MD  [facility]   Present, unchanged

## 2023-07-20 NOTE — CHART NOTE - NSCHARTNOTEFT_GEN_A_CORE
MFM Fellow Note     28y  at 36 weeks with history of GDMA2 on insulin in  testing for oligohydramnios by RUBY, which was 4.4cm prior to presentation. Today's ultrasound RUBY is 3.1cm however there is the presence of a 2x2 fluid pocket, BPP is 10/10 with no decelerations seen on fetal heart tracing. Patient was ruled out in triage for PPROM. Consulted by triage providers and Dr. Minor regarding delivery timing. Patient is normotensive with no signs/symptoms of preeclampsia and is in twice weekly fetal testing. Recommend continued outpatient monitoring with twice weekly  testing, per ACOG recommendations reserve delivery for oligohydramnios if there is no 2x2 fluid pocket (if there is no 2x2 fluid pocket, delivery is recommended at 36-37w6d). If patient continues to have normal MVP, delivery is recommended per usual obstetrical indications, which currently is planned for 39 weeks in setting of poorly-controlled GDM.     Ezio Sumner, PGY-6

## 2023-07-20 NOTE — OB PROVIDER TRIAGE NOTE - ADDITIONAL INSTRUCTIONS
- Please follow up with at your next scheduled appointment for repeat NST / BPP on Monday.   - Please return for decreased/no fetal movement, vaginal bleeding similar to that of a period, leaking/gush of fluid, regular contractions occurring 4-5 minutes for one hour or requiring pain medication

## 2023-07-20 NOTE — OB PROVIDER TRIAGE NOTE - HISTORY OF PRESENT ILLNESS
Ms. RENETTA BLACKBURN is a 28y  @ 36w referred from Dr. Burk's office for decreased RUBY @ 4.4. Has had decreased RUBY w previous ultrasound w repeat wnl so discharged home with follow up. + FM. Denies lof / vb / ctx.   PNC: Dr. Burk. GDMA2, on levemir 28U qAM, 48U qPM, Humalog 8 / 10 / 12. LGA identified  @ 95 %. Denies prenatal issues or complications. Pt reports no hospitalizations, procedures, infections, or new diagnoses in pregnancy. Pt denies complications with blood pressure.

## 2023-07-20 NOTE — OB PROVIDER TRIAGE NOTE - NSHPPHYSICALEXAM_GEN_ALL_CORE
T(C): 36.7 (07-20-23 @ 13:57), Max: 36.7 (07-20-23 @ 13:23)  HR: 96 (07-20-23 @ 13:23) (96 - 96)  BP: 123/62 (07-20-23 @ 13:23) (123/62 - 123/62)  RR: 16 (07-20-23 @ 13:23) (16 - 16)  SpO2: --  General: Female sitting comfortably in no apparent distress.   Head: Normocephalic. Atraumatic.   Eyes: No discharge, lids normal, conjunctiva normal  Lungs: No resp distress  Abdomen: Soft, nontender. Gravid.   TAUS:  To be performed by ATU  NST: Reactive. Category 1.   Neuro: No facial asymmetry, no slurred speech, moves all 4 extremities  Mood: Alert and lucid, appropriate mood and affect

## 2023-07-20 NOTE — OB PROVIDER TRIAGE NOTE - NSOBPROVIDERNOTE_OBGYN_ALL_OB_FT
Ms. RENETTA BLACKBURN is a 28y  @ 36w referred from Dr. Burk's office for decreased RUBY @ 4.4 without evidence of rupture.   PNC: Dr. Burk. GDMA2, on levemir 28U qAM, 48U qPM, Humalog 8 / 10 / 12. LGA identified  @ 95 %    Plan  - ATU BPP Ms. RENETTA BLACKBURN is a 28y  @ 36w referred from Dr. Burk's office for decreased RUBY @ 4.4 without evidence of rupture.   PNC: Dr. Burk. GDMA2, on levemir 28U qAM, 48U qPM, Humalog 8 / 10 / 12. LGA identified  @ 95 %    - ATU Sonographer Roxana @ bedside to scan pt. Notes RUBY 3 with 2x2 pocket.   - Dr. Minor made aware, recommends consultation with MFM regarding delivery vs observation  - Dr. Sumner, MFM fellow, with recommendation to continue with twice weekly fetal testing until 39w, has scheduled induction due to GDMA2  - Dr. Minor made aware of plan, agrees, pt for discharge and close outpatient follow-up.     Plan  - Patient to be discharged home with follow up and return precautions  - Please follow up with at your next scheduled appointment for repeat NST / BPP on Monday.   - Please return for decreased/no fetal movement, vaginal bleeding similar to that of a period, leaking/gush of fluid, regular contractions occurring 4-5 minutes for one hour or requiring pain medication   - Patient educated of plan and demonstrate understanding. All questions answered. Discharge instructions provided and signed.   - Discharged at: 16:00    Plan d/w Dr. Minor

## 2023-07-21 DIAGNOSIS — Z3A.36 36 WEEKS GESTATION OF PREGNANCY: ICD-10-CM

## 2023-07-21 DIAGNOSIS — O24.414 GESTATIONAL DIABETES MELLITUS IN PREGNANCY, INSULIN CONTROLLED: ICD-10-CM

## 2023-07-21 DIAGNOSIS — O36.63X0 MATERNAL CARE FOR EXCESSIVE FETAL GROWTH, THIRD TRIMESTER, NOT APPLICABLE OR UNSPECIFIED: ICD-10-CM

## 2023-07-21 DIAGNOSIS — O41.03X0 OLIGOHYDRAMNIOS, THIRD TRIMESTER, NOT APPLICABLE OR UNSPECIFIED: ICD-10-CM

## 2023-07-22 ENCOUNTER — APPOINTMENT (OUTPATIENT)
Dept: ANTEPARTUM | Facility: CLINIC | Age: 28
End: 2023-07-22
Payer: COMMERCIAL

## 2023-07-22 ENCOUNTER — ASOB RESULT (OUTPATIENT)
Age: 28
End: 2023-07-22

## 2023-07-22 ENCOUNTER — NON-APPOINTMENT (OUTPATIENT)
Age: 28
End: 2023-07-22

## 2023-07-22 ENCOUNTER — INPATIENT (INPATIENT)
Facility: HOSPITAL | Age: 28
LOS: 6 days | Discharge: HOME | End: 2023-07-29
Attending: OBSTETRICS & GYNECOLOGY | Admitting: OBSTETRICS & GYNECOLOGY
Payer: COMMERCIAL

## 2023-07-22 VITALS — HEART RATE: 97 BPM | DIASTOLIC BLOOD PRESSURE: 78 MMHG | SYSTOLIC BLOOD PRESSURE: 133 MMHG

## 2023-07-22 DIAGNOSIS — O26.899 OTHER SPECIFIED PREGNANCY RELATED CONDITIONS, UNSPECIFIED TRIMESTER: ICD-10-CM

## 2023-07-22 DIAGNOSIS — Z87.898 PERSONAL HISTORY OF OTHER SPECIFIED CONDITIONS: ICD-10-CM

## 2023-07-22 DIAGNOSIS — O41.00X0 OLIGOHYDRAMNIOS, UNSPECIFIED TRIMESTER, NOT APPLICABLE OR UNSPECIFIED: ICD-10-CM

## 2023-07-22 LAB
BASOPHILS # BLD AUTO: 0.04 K/UL — SIGNIFICANT CHANGE UP (ref 0–0.2)
BASOPHILS NFR BLD AUTO: 0.4 % — SIGNIFICANT CHANGE UP (ref 0–2)
EOSINOPHIL # BLD AUTO: 0.11 K/UL — SIGNIFICANT CHANGE UP (ref 0–0.5)
EOSINOPHIL NFR BLD AUTO: 1.1 % — SIGNIFICANT CHANGE UP (ref 0–6)
GLUCOSE BLDC GLUCOMTR-MCNC: 109 MG/DL — HIGH (ref 70–99)
GLUCOSE BLDC GLUCOMTR-MCNC: 67 MG/DL — LOW (ref 70–99)
GLUCOSE BLDC GLUCOMTR-MCNC: 67 MG/DL — LOW (ref 70–99)
GLUCOSE BLDC GLUCOMTR-MCNC: 86 MG/DL — SIGNIFICANT CHANGE UP (ref 70–99)
HCT VFR BLD CALC: 35 % — SIGNIFICANT CHANGE UP (ref 34.5–45)
HGB BLD-MCNC: 11.2 G/DL — LOW (ref 11.5–15.5)
IANC: 5.79 K/UL — SIGNIFICANT CHANGE UP (ref 1.8–7.4)
IMM GRANULOCYTES NFR BLD AUTO: 0.7 % — SIGNIFICANT CHANGE UP (ref 0–0.9)
LYMPHOCYTES # BLD AUTO: 3.1 K/UL — SIGNIFICANT CHANGE UP (ref 1–3.3)
LYMPHOCYTES # BLD AUTO: 31.3 % — SIGNIFICANT CHANGE UP (ref 13–44)
MCHC RBC-ENTMCNC: 26.9 PG — LOW (ref 27–34)
MCHC RBC-ENTMCNC: 32 GM/DL — SIGNIFICANT CHANGE UP (ref 32–36)
MCV RBC AUTO: 83.9 FL — SIGNIFICANT CHANGE UP (ref 80–100)
MONOCYTES # BLD AUTO: 0.78 K/UL — SIGNIFICANT CHANGE UP (ref 0–0.9)
MONOCYTES NFR BLD AUTO: 7.9 % — SIGNIFICANT CHANGE UP (ref 2–14)
NEUTROPHILS # BLD AUTO: 5.79 K/UL — SIGNIFICANT CHANGE UP (ref 1.8–7.4)
NEUTROPHILS NFR BLD AUTO: 58.6 % — SIGNIFICANT CHANGE UP (ref 43–77)
NRBC # BLD: 0 /100 WBCS — SIGNIFICANT CHANGE UP (ref 0–0)
NRBC # FLD: 0 K/UL — SIGNIFICANT CHANGE UP (ref 0–0)
PLATELET # BLD AUTO: 332 K/UL — SIGNIFICANT CHANGE UP (ref 150–400)
RBC # BLD: 4.17 M/UL — SIGNIFICANT CHANGE UP (ref 3.8–5.2)
RBC # FLD: 14.5 % — SIGNIFICANT CHANGE UP (ref 10.3–14.5)
RH IG SCN BLD-IMP: POSITIVE — SIGNIFICANT CHANGE UP
WBC # BLD: 9.89 K/UL — SIGNIFICANT CHANGE UP (ref 3.8–10.5)
WBC # FLD AUTO: 9.89 K/UL — SIGNIFICANT CHANGE UP (ref 3.8–10.5)

## 2023-07-22 PROCEDURE — 76819 FETAL BIOPHYS PROFIL W/O NST: CPT | Mod: 26

## 2023-07-22 RX ORDER — INSULIN DETEMIR 100/ML (3)
48 INSULIN PEN (ML) SUBCUTANEOUS AT BEDTIME
Refills: 0 | Status: DISCONTINUED | OUTPATIENT
Start: 2023-07-22 | End: 2023-07-23

## 2023-07-22 RX ORDER — GLUCAGON INJECTION, SOLUTION 0.5 MG/.1ML
1 INJECTION, SOLUTION SUBCUTANEOUS ONCE
Refills: 0 | Status: DISCONTINUED | OUTPATIENT
Start: 2023-07-22 | End: 2023-07-23

## 2023-07-22 RX ORDER — INSULIN DETEMIR 100/ML (3)
28 INSULIN PEN (ML) SUBCUTANEOUS
Refills: 0 | DISCHARGE

## 2023-07-22 RX ORDER — INSULIN LISPRO 100/ML
12 VIAL (ML) SUBCUTANEOUS
Refills: 0 | Status: DISCONTINUED | OUTPATIENT
Start: 2023-07-22 | End: 2023-07-23

## 2023-07-22 RX ORDER — SODIUM CHLORIDE 9 MG/ML
1000 INJECTION, SOLUTION INTRAVENOUS
Refills: 0 | Status: DISCONTINUED | OUTPATIENT
Start: 2023-07-22 | End: 2023-07-23

## 2023-07-22 RX ORDER — INSULIN LISPRO 100/ML
0 VIAL (ML) SUBCUTANEOUS
Refills: 0 | DISCHARGE

## 2023-07-22 RX ORDER — HEPARIN SODIUM 5000 [USP'U]/ML
5000 INJECTION INTRAVENOUS; SUBCUTANEOUS EVERY 12 HOURS
Refills: 0 | Status: DISCONTINUED | OUTPATIENT
Start: 2023-07-22 | End: 2023-07-23

## 2023-07-22 RX ORDER — INSULIN LISPRO 100/ML
12 VIAL (ML) SUBCUTANEOUS ONCE
Refills: 0 | Status: DISCONTINUED | OUTPATIENT
Start: 2023-07-22 | End: 2023-07-22

## 2023-07-22 RX ORDER — INSULIN DETEMIR 100/ML (3)
28 INSULIN PEN (ML) SUBCUTANEOUS
Refills: 0 | Status: DISCONTINUED | OUTPATIENT
Start: 2023-07-22 | End: 2023-07-23

## 2023-07-22 RX ORDER — INSULIN LISPRO 100/ML
10 VIAL (ML) SUBCUTANEOUS
Refills: 0 | Status: DISCONTINUED | OUTPATIENT
Start: 2023-07-22 | End: 2023-07-23

## 2023-07-22 RX ORDER — FAMOTIDINE 10 MG/ML
20 INJECTION INTRAVENOUS
Refills: 0 | Status: DISCONTINUED | OUTPATIENT
Start: 2023-07-22 | End: 2023-07-23

## 2023-07-22 RX ORDER — DEXTROSE 50 % IN WATER 50 %
25 SYRINGE (ML) INTRAVENOUS ONCE
Refills: 0 | Status: DISCONTINUED | OUTPATIENT
Start: 2023-07-22 | End: 2023-07-23

## 2023-07-22 RX ORDER — INSULIN LISPRO 100/ML
8 VIAL (ML) SUBCUTANEOUS ONCE
Refills: 0 | Status: DISCONTINUED | OUTPATIENT
Start: 2023-07-22 | End: 2023-07-22

## 2023-07-22 RX ORDER — DEXTROSE 50 % IN WATER 50 %
12.5 SYRINGE (ML) INTRAVENOUS ONCE
Refills: 0 | Status: DISCONTINUED | OUTPATIENT
Start: 2023-07-22 | End: 2023-07-23

## 2023-07-22 RX ORDER — INSULIN LISPRO 100/ML
10 VIAL (ML) SUBCUTANEOUS ONCE
Refills: 0 | Status: DISCONTINUED | OUTPATIENT
Start: 2023-07-22 | End: 2023-07-22

## 2023-07-22 RX ORDER — DEXTROSE 50 % IN WATER 50 %
15 SYRINGE (ML) INTRAVENOUS ONCE
Refills: 0 | Status: DISCONTINUED | OUTPATIENT
Start: 2023-07-22 | End: 2023-07-23

## 2023-07-22 RX ORDER — INSULIN LISPRO 100/ML
8 VIAL (ML) SUBCUTANEOUS
Refills: 0 | Status: DISCONTINUED | OUTPATIENT
Start: 2023-07-22 | End: 2023-07-23

## 2023-07-22 RX ADMIN — Medication 48 UNIT(S): at 23:04

## 2023-07-22 RX ADMIN — Medication 12 UNIT(S): at 18:50

## 2023-07-22 RX ADMIN — FAMOTIDINE 20 MILLIGRAM(S): 10 INJECTION INTRAVENOUS at 21:35

## 2023-07-22 RX ADMIN — HEPARIN SODIUM 5000 UNIT(S): 5000 INJECTION INTRAVENOUS; SUBCUTANEOUS at 18:53

## 2023-07-22 NOTE — OB RN TRIAGE NOTE - PAIN SCALE PREFERRED, PROFILE
Telephone Encounter by Kristopher Engle RN, BSN at 11/01/18 08:38 AM     Author:  Kristopher Engle RN, BSN Service:  (none) Author Type:  Registered Nurse     Filed:  11/01/18 08:39 AM Encounter Date:  10/22/2018 Status:  Signed     :  Kristopher Engle RN, BSN (Registered Nurse)            Left message on answering machine to call back.[AF1.1T]       Revision History        User Key Date/Time User Provider Type Action    > AF1.1 11/01/18 08:39 AM Kristopher Engle RN, BSN Registered Nurse Sign    T - Template             numerical 0-10

## 2023-07-22 NOTE — OB PROVIDER TRIAGE NOTE - NSHPPHYSICALEXAM_GEN_ALL_CORE
Vital Signs Last 24 Hrs  T(C): 37 (22 Jul 2023 13:33), Max: 37.0 (22 Jul 2023 13:29)  T(F): 98.6 (22 Jul 2023 13:33), Max: 98.6 (22 Jul 2023 13:29)  HR: 97 (22 Jul 2023 13:33) (97 - 97)  BP: 133/78 (22 Jul 2023 13:33) (133/78 - 133/78)  BP(mean): --  RR: 16 (22 Jul 2023 13:33) (16 - 16)  SpO2: --    abdomen soft, non tender  HR reg rate, rhythm  lungs clear, equal bl  nst: 150bpm, moderate variability, +accels, no decels. reactive  toco: irregular  tas: images saved in asob, vertex, anterior placenta, ruthie 3.24cm (>2x2 pocket), bpp 8/8, m-mode 152bpm

## 2023-07-22 NOTE — OB PROVIDER H&P - ASSESSMENT
27y/o  at 36.2weeks admitted for observation for oligohydramnios with history of decreased fetal movement on presentation.    - Plan for admission per MFM MD Pal[place] recommendation   - Discussed with Dr. Iraheta & Dr. Canchola PGY-3  - Admit to Antepartum  - 2hr NST BID  - Consistent carbohydrate diet order  - Consent signed  - No objection to blood tranfusion  - Standard labs (T&S, CBC, RPR)  - Plan for repeat ATU sonogram and MFM consult in AM .  - GBS culture obtained and sent     GDMA2:  - Monitor FS fasting and 1hr postprandial  - continue patient's home insulin regimen:  - Levemir 28u AM  - Levemir 48u HS  - Humalou pre- breakfast  - Humalog: 10 pre- lunch  - Humalou pre- dinner    Risks, benefits, alternatives, and possible complications have been discussed in detail with the patient in her native language. All questions answered, all appropriate hospital consents were signed.

## 2023-07-22 NOTE — OB PROVIDER TRIAGE NOTE - HISTORY OF PRESENT ILLNESS
PNC: Dr. Burk    27y/o  at 36.2weeks presents with c/o decreased fetal movement since this morning. Pt denies contractions, vb, lof.     ATU Sono: RUBY 3.18cm, >2x2 pocket, vertex, ant. placenta,  bpp   AT Sono: RUBY 9.59cm, efw 2883g, 6#6 95%-ile    AP Course complicated by:  -GDMA2- Levemir 28u AM, 48u HS (took yesterday), Humalog 8u breakfast (took this AM), 10u lunch, 12u dinner. pt reports she took her blood sugar a few days ago and she doesnt remember her FS#.  -per antepartum record, FS 97 .  -Oligohydramnios:  ATU Sono: RUBY 3.18cm, >2.2 pocket, PNC: Dr. Burk    29y/o  at 36.2weeks presents with c/o decreased fetal movement since this morning. Pt denies contractions, vb, lof.     ATU Sono: RUBY 3.18cm, >2x2 pocket, vertex, ant. placenta,  bpp   AT Sono: RUBY 9.59cm, efw 2883g, 6#6 95%-ile    AP Course complicated by:  -GDMA2- Levemir 28u AM, 48u HS (took yesterday), Humalog 8u breakfast (took this AM), 10u lunch, 12u dinner. pt reports she took her blood sugar a few days ago and she doesnt remember her FS#.  -per antepartum record, FS 97 .  -Oligohydramnios:  ATU Sono: RUBY 3.18cm, >2x2 pocket. PNC: Dr. Burk    29y/o  at 36.2weeks presents with c/o decreased fetal movement since this morning. Pt denies contractions, vb, lof.     ATU Sono: RUBY 3.18cm, >2x2 pocket, vertex, ant. placenta,  bpp   AT Sono: RUBY 9.59cm, efw 2883g, 6#6 95%-ile    AP Course complicated by:  -GDMA2- Levemir 28u AM, 48u HS (took yesterday), Humalog 8u breakfast (took this AM), 10u lunch, 12u dinner. pt reports she took her blood sugar a few days ago and she doesnt remember her FS#.  -per antepartum record, FS 97 from .  -Oligohydramnios:  ATU Sono: RUBY 3.18cm, >2x2 pocket.

## 2023-07-22 NOTE — OB RN PATIENT PROFILE - FALL HARM RISK - UNIVERSAL INTERVENTIONS
Bed in lowest position, wheels locked, appropriate side rails in place/Call bell, personal items and telephone in reach/Instruct patient to call for assistance before getting out of bed or chair/Non-slip footwear when patient is out of bed/[place] to call system/Physically safe environment - no spills, clutter or unnecessary equipment/Purposeful Proactive Rounding/Room/bathroom lighting operational, light cord in reach

## 2023-07-22 NOTE — OB PROVIDER H&P - HISTORY OF PRESENT ILLNESS
PNC: Dr. Burk    29y/o  at 36.2weeks presents with c/o decreased fetal movement since this morning. Pt denies contractions, vb, lof.     ATU Sono: RUBY 3.18cm, >2x2 pocket, vertex, ant. placenta,  bpp   AT Sono: RUBY 9.59cm, efw 2883g, 6#6 95%-ile     fetal echo wnl per report.    AP Course complicated by:  -GDMA2- Levemir 28u AM, 48u HS (took yesterday), Humalog 8u breakfast (took this AM), 10u lunch, 12u dinner. pt reports she took her blood sugar a few days ago and she doesnt remember her FS#.  -per antepartum record, FS 97 from .  -Oligohydramnios:  ATU Sono: RUBY 3.18cm, >2x2 pocket.

## 2023-07-22 NOTE — OB RN TRIAGE NOTE - FALL HARM RISK - UNIVERSAL INTERVENTIONS
Bed in lowest position, wheels locked, appropriate side rails in place/Call bell, personal items and telephone in reach/Instruct patient to call for assistance before getting out of bed or chair/Non-slip footwear when patient is out of bed/[place] to call system/Physically safe environment - no spills, clutter or unnecessary equipment/Purposeful Proactive Rounding/Room/bathroom lighting operational, light cord in reach

## 2023-07-22 NOTE — OB PROVIDER TRIAGE NOTE - NSOBPROVIDERNOTE_OBGYN_ALL_OB_FT
27y/o  at 36.2weeks evaluated for decreased fm.  -nst complete and reactive. 27y/o  at 36.2weeks evaluated for decreased fm.  -nst in progress.    -nst reactive and complete.  -pt reporting positive fetal movement.  -RUBY 3.24cm  -GDMA2 noncompliance d/w MD Iraheta. MD Iraheta to discuss with MFM regarding plan of care. 27y/o  at 36.2weeks evaluated for decreased fm.  -nst in progress.    -nst reactive and complete.  -pt reporting positive fetal movement.  -RUBY 3.24cm  -GDMA2 noncompliance d/w MD Iraheta. MD Iraheta to discuss with MFM regarding pt plan of care.    -per MD Palencia MFM recommendation, admit for observation.

## 2023-07-22 NOTE — OB RN PATIENT PROFILE - NS_PRENATALSTART_OBGYN_ALL_OB
PATIENT NAME: AMI RODGERS  MEDICAL RECORD NUMBER: [id number]  ACCOUNT NUMBER: [id number]  ADMIT DATE: 2019  DISCHARGE DATE:    ATTENDING PHYSICIAN: Jarvis Babcock M.D. INTERNAL MEDICINE  ROOM #: [id number]  SERVICE: OBE      CONSULTATION    CONSULTING PHYSICIAN: Walter Miller M.D. CARDIOLOGY  DATE OF CONSULT: 2019    HISTORY OF PRESENT ILLNESS:  This is a 66-year-old gentleman with known  diabetes mellitus type 2, hyperlipidemia, and has been told that he has had  sleep apnea in the past.  He thinks that he may have had episodes of  tachycardia in the past, particularly when he is exercising, he has noted on  the exercise monitor that his heart rate would go up and sometimes feels a  little lightheaded, but denied any actual heart pounding, dizziness, chest  pain, syncope, or congestive heart failure symptoms.  He went to see his  primary physician yesterday and was found to be in atrial fibrillation with  rapid ventricular response on the EKG and was sent to the emergency room.  There, he was treated with IV diltiazem and subsequently, he converted  spontaneously into sinus rhythm.  He may have had 1 other episode of paroxysmal  AFib; however, since admission, has remained in sinus rhythm.  He also was  given a dose of Lovenox.  Otherwise, serial cardiac markers have been normal.  Baseline electrocardiogram has been normal.  Chest radiograph was read by  Radiology as demonstrating a normal heart size with no lung consolidation or  effusion.  No mediastinal widening or adenopathy.  Cardiology consultation has  been requested.     PAST MEDICAL HISTORY:  As noted above.    ALLERGIES:  NONE KNOWN TO MEDICATIONS.    PAST SURGICAL HISTORY:  Cervical and lumbar fusion and left hand reconstruction.    SOCIAL HISTORY:  Smokes cigars up until .  Drinks alcohol in moderation,  perhaps 2 beers twice a week.     FAMILY HISTORY:  Father  at the age of 65 of myocardial infarction.  Mother  is still living  at the age of 85, but has had breast cancer and lung cancer.     MEDICATIONS:  Please see list.    REVIEW OF SYSTEMS:  Review of 10 major organ systems is noncontributory other  than what was elicited in the history of present illness.     PHYSICAL EXAMINATION:  GENERAL:  Well-developed, well-nourished gentleman, who  is alert and oriented x3 and who is in no acute distress.   VITAL SIGNS:  Demonstrate blood pressure of 122/80, pulse 68, and respiratory  rate 17.  The patient is afebrile.   SKIN:  Warm, dry, and well perfused.   HEENT:  Normocephalic and atraumatic.  Eyes, PERRLA.  Conjunctivae pink.  Oral  mucosa moist.  Sclerae anicteric.   NECK:  No JVD or hepatojugular reflux.  Carotid upstrokes 2+.  No bruits or  thyromegaly.   CARDIAC:  Normal S1 and S2.  No gallop, murmur, or rub.  Point of maximal  impulse is not displaced.   CHEST:  Symmetrical.   LUNGS:  Clear to auscultation.   ABDOMEN:  Soft and nontender.  No organomegaly, masses, or bruits.  BACK:  No kyphoscoliosis or CVA tenderness.   EXTREMITIES:  No cords, cyanosis, clubbing, or edema.  Dorsalis pedis pulses  2+.   NEUROLOGIC:  Cranial nerves and motor strength grossly intact.    LABORATORY DATA:  TSH 1.079.  Sodium 134, potassium 4.5, chloride 99, bicarb  24, BUN 16, creatinine 1.06, glucose 288, magnesium 1.4.  Troponins negative  x3.  WBC 9.2, hemoglobin 17.2, hematocrit 49.8, platelets 186,000.  PT 10.1,  INR 1.0, PTT 27.     ASSESSMENT:    1. Paroxysmal atrial fibrillation.  CHADS-VASc score of 2.  The patient's  underlying etiology is probably due to sleep apnea, rule out valvular heart  disease, rule out significant coronary artery disease.   2. Presently, the patient is stable and has no chest pain, congestive heart  failure symptoms, dizziness, or syncope.   3. Diabetes mellitus type 2, not controlled.  4. Sleep apnea.  5. Hyperlipidemia.    PLAN:    1. I will review the echocardiogram with Doppler and if this is unremarkable,  the patient  will be considered stable for discharge and will be referred for  outpatient stress testing to rule out significant coronary artery disease.  2. The patient will be given prescription for diltiazem extended release for  rate control of potential future atrial fibrillation episodes.   3. The patient will need to be on chronic anticoagulation due to elevated  CHADS-VASc score.  Options of warfarin versus novel oral anticoagulants was  discussed.  I have recommended Eliquis due to its improved efficacy and safety  profile.  Benefits and risks were discussed at length with the patient, who  understands and agrees to proceed.   4. I will see the patient in the office in about 6 weeks.   Thank you for allowing me to participate in this patient's cardiovascular  evaluation and care, I shall follow with you.     I spent 70 minutes on the unit, over half of which was in face-to-face  discussion and coordination of care on the unit.           Walter Miller M.D. CARDIOLOGY      CC:          Walter Miller M.D.     KENY  DD: 02/06/2019  DT: 02/06/2019  TD: 10:23  TT: 11:08  [id number]   Started first trimester

## 2023-07-23 DIAGNOSIS — Z04.9 ENCOUNTER FOR EXAMINATION AND OBSERVATION FOR UNSPECIFIED REASON: ICD-10-CM

## 2023-07-23 LAB
A1C WITH ESTIMATED AVERAGE GLUCOSE RESULT: 6.1 % — HIGH (ref 4–5.6)
ESTIMATED AVERAGE GLUCOSE: 128 — SIGNIFICANT CHANGE UP
GLUCOSE BLDC GLUCOMTR-MCNC: 104 MG/DL — HIGH (ref 70–99)
GLUCOSE BLDC GLUCOMTR-MCNC: 138 MG/DL — HIGH (ref 70–99)
GLUCOSE BLDC GLUCOMTR-MCNC: 82 MG/DL — SIGNIFICANT CHANGE UP (ref 70–99)
GLUCOSE BLDC GLUCOMTR-MCNC: 84 MG/DL — SIGNIFICANT CHANGE UP (ref 70–99)
GLUCOSE BLDC GLUCOMTR-MCNC: 84 MG/DL — SIGNIFICANT CHANGE UP (ref 70–99)
GLUCOSE BLDC GLUCOMTR-MCNC: 88 MG/DL — SIGNIFICANT CHANGE UP (ref 70–99)
T PALLIDUM AB TITR SER: NEGATIVE — SIGNIFICANT CHANGE UP

## 2023-07-23 PROCEDURE — 99253 IP/OBS CNSLTJ NEW/EST LOW 45: CPT | Mod: GC,25

## 2023-07-23 RX ORDER — CITRIC ACID/SODIUM CITRATE 300-500 MG
15 SOLUTION, ORAL ORAL EVERY 6 HOURS
Refills: 0 | Status: DISCONTINUED | OUTPATIENT
Start: 2023-07-23 | End: 2023-07-25

## 2023-07-23 RX ORDER — AMPICILLIN TRIHYDRATE 250 MG
2 CAPSULE ORAL ONCE
Refills: 0 | Status: COMPLETED | OUTPATIENT
Start: 2023-07-23 | End: 2023-07-23

## 2023-07-23 RX ORDER — SODIUM CHLORIDE 9 MG/ML
1000 INJECTION, SOLUTION INTRAVENOUS
Refills: 0 | Status: DISCONTINUED | OUTPATIENT
Start: 2023-07-23 | End: 2023-07-25

## 2023-07-23 RX ORDER — SODIUM CHLORIDE 9 MG/ML
1000 INJECTION, SOLUTION INTRAVENOUS ONCE
Refills: 0 | Status: DISCONTINUED | OUTPATIENT
Start: 2023-07-23 | End: 2023-07-25

## 2023-07-23 RX ORDER — SODIUM CHLORIDE 9 MG/ML
500 INJECTION, SOLUTION INTRAVENOUS ONCE
Refills: 0 | Status: DISCONTINUED | OUTPATIENT
Start: 2023-07-23 | End: 2023-07-25

## 2023-07-23 RX ORDER — AMPICILLIN TRIHYDRATE 250 MG
1 CAPSULE ORAL EVERY 4 HOURS
Refills: 0 | Status: DISCONTINUED | OUTPATIENT
Start: 2023-07-23 | End: 2023-07-25

## 2023-07-23 RX ORDER — OXYTOCIN 10 UNIT/ML
333.33 VIAL (ML) INJECTION
Qty: 20 | Refills: 0 | Status: DISCONTINUED | OUTPATIENT
Start: 2023-07-23 | End: 2023-07-28

## 2023-07-23 RX ORDER — CHLORHEXIDINE GLUCONATE 213 G/1000ML
1 SOLUTION TOPICAL DAILY
Refills: 0 | Status: DISCONTINUED | OUTPATIENT
Start: 2023-07-23 | End: 2023-07-25

## 2023-07-23 RX ADMIN — Medication 200 GRAM(S): at 12:21

## 2023-07-23 RX ADMIN — CHLORHEXIDINE GLUCONATE 1 APPLICATION(S): 213 SOLUTION TOPICAL at 14:55

## 2023-07-23 RX ADMIN — Medication 108 GRAM(S): at 15:48

## 2023-07-23 RX ADMIN — Medication 108 GRAM(S): at 20:30

## 2023-07-23 RX ADMIN — HEPARIN SODIUM 5000 UNIT(S): 5000 INJECTION INTRAVENOUS; SUBCUTANEOUS at 06:11

## 2023-07-23 RX ADMIN — Medication 28 UNIT(S): at 08:25

## 2023-07-23 RX ADMIN — SODIUM CHLORIDE 125 MILLILITER(S): 9 INJECTION, SOLUTION INTRAVENOUS at 20:36

## 2023-07-23 RX ADMIN — SODIUM CHLORIDE 125 MILLILITER(S): 9 INJECTION, SOLUTION INTRAVENOUS at 12:20

## 2023-07-23 RX ADMIN — Medication 8 UNIT(S): at 08:02

## 2023-07-23 NOTE — OB PROVIDER LABOR PROGRESS NOTE - ASSESSMENT
Plan:  28y P0 IOL   - balloon in place  - Continue with PO cytotec    Plan per Dr. Minor  Examined with Alina Bansal PGY-1

## 2023-07-23 NOTE — CHART NOTE - NSCHARTNOTEFT_GEN_A_CORE
27yo  w GDMA2 @36w3d IOL for decreased FM. Cervical balloon placed at 8p. Patient is receiving po Cytotec. Is requesting an epidural, anesthesia aware.    EFM: 140/mod variability/+accels/decels  [place]: q2-5mins    Assessment: In stable condition   - Cont IOL  - Cont EFM, [place]  - Cont IVF    Sherie Najera  PGY-1

## 2023-07-23 NOTE — PROGRESS NOTE ADULT - SUBJECTIVE AND OBJECTIVE BOX
MFM Fellow Consult Note    HPI: 28 year old P0 at 36w3d currently admitted in the setting of A2GDM (suboptimal control), persistent decreased fetal movement as well as oligo (~2x2 pocket). MFM consulted for further management. In review patient presented this thursday from the office with borderline oligo was seen and evaluated in triage and subsequently discharged with reassuring fetal status. Presented yesterday with decreased fetal movement and found to have worsening oligo. This morning pt w/o complaints other than persistent DFM. Denies ctx/lof/vb.      O:  ICU Vital Signs Last 24 Hrs  T(C): 36.9 (23 Jul 2023 09:21), Max: 37.0 (22 Jul 2023 13:29)  T(F): 98.4 (23 Jul 2023 09:21), Max: 98.6 (22 Jul 2023 13:29)  HR: 94 (23 Jul 2023 09:21) (82 - 104)  BP: 126/74 (23 Jul 2023 09:21) (108/66 - 133/78)  BP(mean): --  ABP: --  ABP(mean): --  RR: 18 (23 Jul 2023 09:21) (16 - 19)  SpO2: 99% (23 Jul 2023 09:21) (95% - 100%)  Gen: well appearing                          11.2   9.89  )-----------( 332      ( 22 Jul 2023 17:14 )             35.0       ATU Ultrasound   EFW 7lbs 6oz  RUBY ~3cm, ~2x2cm pocket

## 2023-07-23 NOTE — PROGRESS NOTE ADULT - ASSESSMENT
A/P: 28 year old P0 at 36w3d currently admitted in the setting of A2GDM (suboptimal control), persistent decreased fetal movement as well as oligo (~2x2 pocket). VSS. continues w/ DFM. Overall clinical picture c/f placental insufficiency given no other explaination for oligo in the setting of suboptimal A2GDM as well as decreased fetal movement - this said would reccomend delivery at this ttime 2/2 risk of prematurity at this late  stage is outweighed by the potential risk of still birth. Communicated rec to Dr. Wu. Please let us know if you have any other questions.    Patient seen w/ Dr. Romero (M attending)    Parveen Palencia M.D. FACOG PGY-7  Maternal Fetal Medicine Fellow  Cell: [phone] if after 5pm or weekend ask labor and delivery for on call fellow   A/P: 28 year old P0 at 36w3d currently admitted in the setting of A2GDM (suboptimal control), persistent decreased fetal movement as well as oligo (~2x2 pocket). VSS. continues w/ DFM. Overall clinical picture c/f placental insufficiency given no other explaination for oligo in the setting of suboptimal A2GDM as well as decreased fetal movement - this said would recommend delivery at this time 2/2 risk of prematurity at this late  stage is outweighed by the potential risk of still birth. Communicated rec to Dr. Wu. Please let us know if you have any other questions.    Patient seen w/ Dr. Romero (MFM attending)    Parveen Palencia M.D. FACOG PGY-7  Maternal Fetal Medicine Fellow  Cell: [phone] if after 5pm or weekend ask labor and delivery for on call fellow

## 2023-07-23 NOTE — OB PROVIDER IHI INDUCTION/AUGMENTATION NOTE - NSCHECKLIST_OBGYN_ALL_OB_CAL
[facility] Discharge Summary   Michelle Jama MRN# [id number]   Age: 30 year old  YOB: 1991   Date of Admission: 4/12/2022  Date of Discharge:  5/2/22  Admitting Physician: Robbie Tena MD  Discharge Physician:  Dr. Yeung  Discharge Diagnoses:    1. S/p [place]us CAR-T  2. ALL  3. CRS resolved  4. Neutropenia/pancytopenia 2/2 Car-t  5. Hx of neutropenic fevers, resolved  6. Risk of malnutrition 2/2 Car-T, s/p TPN  7. Neurotoxicity 2/2 Car-T, now resolved  Discharge Medications:         Medication List      Started    gabapentin 100 MG capsule  Commonly known as: NEURONTIN  100 mg, Oral, AT BEDTIME     levETIRAcetam 750 MG tablet  Commonly known as: KEPPRA  750 mg, Oral, 2 TIMES DAILY     LORazepam 0.5 MG tablet  Commonly known as: ATIVAN  0.5-1 mg, Oral, EVERY 4 HOURS PRN     pantoprazole 40 MG EC tablet  Commonly known as: PROTONIX  40 mg, Oral, 2 TIMES DAILY BEFORE MEALS     posaconazole 100 MG EC tablet  Commonly known as: NOXAFIL  300 mg, Oral, EVERY MORNING  Start taking on: May 3, 2022     predniSONE 10 MG tablet  Commonly known as: DELTASONE  Take 2 tablets (20 mg) by mouth daily for 2 days, THEN 1 tablet (10 mg) daily for 3 days, THEN 0.5 tablets (5 mg) daily for 3 days, THEN 0.5 tablets (5 mg) every other day for 3 days.  Start taking on: May 3, 2022     prochlorperazine 5 MG tablet  Commonly known as: COMPAZINE  5-10 mg, Oral, EVERY 6 HOURS PRN     senna-docusate 8.6-50 MG tablet  Commonly known as: SENOKOT-S/PERICOLACE  1 tablet, Oral, 2 TIMES DAILY PRN     traMADol 50 MG tablet  Commonly known as: ULTRAM  25 mg, Oral, EVERY 6 HOURS PRN          Brief History of Illness:    **Adopted from H&P   Ms. Jama is a 31 yo woman with relapsed ALL (treatment related; hx breast cancer), status post MA Allo MUD PBSCT for ALL July 2021. She is being admitted for [place] CAR-T therapy, Day 0 today. She completed chest wall radiation tx 3/22/2022 (12 fractions for localized recurrence).  She had mild rhinorrhea (RVP neg 4/4), symptoms resolved currently; s/p empriic 7d course doxycycline. She had LD chemo outpatient last week and tolerated well with the exception of mild nausea and constipation (perhaps from Zofran). Today she is feeling relatively well. Denies fevers, URI sx, GI complaints, bleeding.           Diagnosis and Treatment Summary      Treatment/Chemotherapy Number of Cycles Date Range Outcomes & Complications   Br ca history 2019 therapy:  Doxorubicin, Cyclophosphamide, paclitaxel. Tamoxifen 4 August 2019     HyperCVAD   3/14/2021 Neutropenic fevers, duodenitis; hypotension ICU x24 hours without pressors   HyperCVAD 1B   5/12/2021     Transplant Prep: TBI x8 fractions (Post-transplant Cytoxan)   7/2022 Strep mitis baceremia, mucositis, malnutrition. 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 This B-lymphoblastic leukemia/lymphoma may represent a therapy-related neoplasm. No evidence of BCR.ABL or iAMP21 abnormality.     LP on 3/12 showed 0 RBC, 0WBC but flow positive for 18% blasts with note saying peripheral blood contamination can not be excluded. LP was negative on 3/22/2021 and all subsequent LP's have been negative.      She was transferred to [facility] for further work-up and management and was started on induction chemotherapy with HyperCVAD Cycle 1A on 3/14/21. Her hospital course was complicated by the development of neutropenic fevers, attributed to duodenitis seen on CT  "(3/30), and for which she was treated with IV antibiotics, which were then transitioned to oral ciprofloxacin and metronidazole upon clinical improvement. During the initial phase of her infection, she developed hypotension which was borderline refractory to fluid resuscitation and for which she required a ~24h ICU stay; no pressors were ever started, and MAPs improved without further intervention. Darlin subsequently improved with further antibiotics and recovery of her counts.      Restaging bone marrow biopsy 4/5/21 with CR, FISH: 46,XX[20] No cells with a t(4;11) or other clonal chromosomal abnormality were detected among the 20 metaphase cells analyzed by [facility].. LP 4/6/21 with no signs of disease.      Her mid-April 2021 admission was delayed due to COVID-19 exposure. She initially tested negative on 4/11/21 and 4/13/21. She developed symptoms of a scratchy throat and dry cough on 4/14/21. Subsequent testing on 4/16/21 returned positive. Symptoms reported on 4/16/21 include cough, body aches, chills, and fever up to 100.2F. She has had treatment with covid monoclonal antibodies. 6/30/2021 pre-admission testing is negative for Covid-19.      Hx stage IIA L-sided breast cancer, T2N0, ER 20%, CA/HER2 negative breast cancer with BRCA-1 mutation s/p bilateral mastectomy and BSO. She does not have the bi-allelic BRCA gene mutation, making Fanconi anemia much less likely.     5/12/2021 HYPERCVAD 1B   7/6/2021 MA MUD PBSCT       Physical Exam:    BP (!) 86/53 (BP Location: Right arm)   Pulse 95   Temp 98.2  F (36.8  C) (Oral)   Resp 16   Ht 1.585 m (5' 2.4\")   Wt 51.5 kg (113 lb 8 oz)   SpO2 96%   BMI 20.49 kg/m    General: NAD.  Eyes: sclera anicteric, PERRL  Lungs: CTAB  Cardiovascular:no longer tachy but regular, no M/R/G   Lymphatics: no edema  Skin: no rashes or petechiae  Neuro: alert and oriented x3, follows all commands, answering in full sentence.   Access: PICC R arm NT, dressing [facility] " Course:    Michelle Jama is a 31 yo woman s/p MA Allo MUD PBSCT for ALL (hx of breast cancer), with relapsed B cell ALL. Completed chest wall radiation tx 3/22/2022. Chest wall disease <5cm.   Currently Day +20 s/p [place] CAR-T.      Tecartus CAR-T/ALL:  S/p LD chemo with flu/Cy  - Day-0 Tecartus infusion (4/12/22).    - ICE on 4/29: scores 10/10; grade 0 neurotoxicity. EEG negative for seizures. LP neg for infection. flow and cytology neg for leukemia. Continue keppra, plan to do slow taper in clinic.  Decrease decadron 5mg q 12 hours, last day on Sunday, 5/1--see below for prolonged steroid taper. Completed  anikinra daily for 3 days, last dose on 4/27.  - MRI head showed areas of enhancement concerning for leukemic infiltrate.   ophthalmology exam confirms no papilledema. Opening pressure ok. LP neg for leukemia by flow and cytology.  - CRS grade 1 started 4/20 (fevers); then grade 2 CRS on 4/24 (fever + hypotension). Since grade 0.  -Neuro tox started 4/24, was grade 3 on 4/26 and now resolved on 4/28-4/29.  - continue allopurinol  - celebrex prn fever, pain (acetaminophen allergy)     - 4/30 CRS Grade 2: developed asymptomatic hypotension not responsive to 500cc bolus x 3. Given toci x 1. Cx'd and started on cefepime.    - 5/2 Currently CRS Grade 0 and no neurotox. Will plan a longer steroid taper. Received dex 5mg IV x 2 4/30. Given 5mg IV x 1 5/1 am and will plan prednisone 20mg daily 5/2. Infectious w/u currently neg. For complete workup given chemo hx got an echo to assess EF-60-65%.      Restage per protocol (currently only imaging is ordered per the Treatment Plan: D28 PET). Plan for D21 BMbx per Dr. Yeung--requested on discharge. NC working on this     HEME/COAG:   -   Last dose 5/2 prior to discharge.  - coagulopathy: consumption related to CAR-T-never received Cryo. Did receive Vit K w/ INR correction. Fibrinogen overall improved 142.   - pancytopenia/neutropenia secondary to ALL and  chemotherapy.   - Transfusion parameters: hemoglobin <7, platelets <10  No premeds.      ID: afebrile.  - History of neutropenic fevers: likely CRS vs infection. CT CAP=neg, BC=neg and LP neg.  W/ neg w/u changed empiric cefepime to levaquin while neutropenic. 4/30 with hypotension and neutropenia cefepime resumed. Procal neg. BC's NGTD. 4/26 covid neg. Stop cefepime 5/2, resume levaquin  - Prophylaxis: fluc changed to vfend and then had hallucination, changed to amaya and then changed to posaconazole(covered 0$)., ACV, pentamidine (last 4/22);   Premed with xopenex d/t tachycardia.   - CMV 4/26 neg. 4/15 IgG=628  - 4/25 meningitis/encephalitis panel=neg, EBV, VZV, HSV=neg and CMV on CSF 4/24,      RENAL/ELECTROLYTES/:   - 4/30 stopped cycled TPN    - Electrolyte management: replace per sliding scale  - Risk of malnutrition: dietician to follow     GI:   DWAYNE: Kytril, Ativan, Compazine prn. (recent constipation possibly due to Zofran given with LD chemo)  Constipation: Colace, Miralax prn  Protonix for GI prophy     Psych:   - hx depression: cont Effexor  - Unisom qhs sleep     Neuro:   Persistent HA is better overall. celebrex, tramadol, dilaudid, flexeril. Continue keppra.  Head CT negative.  MRI with possible leukemic infiltrates?  Lumbar puncture 4/24 neg. FLow and cytology=neg for leukemia.     Pounding in her ears x 1 year.  Per ENT, could be TMJ.  Heat packs. Reviewed  MRI 1/2022. Had Audiogram 11/22 and saw ENT 11/24/2022, from TMJ?  4/23 consulted palliative care but now they have signed off     Endo:  improved off steroids.      I spent 45 minutes face-to-face and/or coordinating care. Over 50% of our time on the unit was spent counseling the patient and/or coordinating care regarding CAR-T therapy/course.         Discharge Instructions and Follow-Up:    Discharge diet: Regular diet as tolerated  Discharge activity: Activity as tolerated   Follow up with BMT    Discharge Disposition:    Discharged to  home.    Virgen Mckee NP  May 2, 2022    Attending Summary  I saw and examined the patient and agree with the details noted above.  Total time in coordinating discharge plan was <30 minutes.             5

## 2023-07-23 NOTE — PROGRESS NOTE ADULT - SUBJECTIVE AND OBJECTIVE BOX
Patient seen and examined at bedside, no acute overnight events. No acute complaints. Patient endorses good fetal movement. Patient is ambulating and tolerating regular diet. Denies CP, SOB, N/V, fevers, chills, or any other concerns.    Vital Signs Last 24 Hours  T(C): 36.8 (07-23-23 @ 01:17), Max: 37.0 (07-22-23 @ 13:29)  HR: 92 (07-23-23 @ 01:17) (85 - 104)  BP: 115/58 (07-23-23 @ 01:17) (108/66 - 133/78)  RR: 18 (07-23-23 @ 01:17) (16 - 19)  SpO2: 97% (07-23-23 @ 01:17) (95% - 97%)    I&O's Summary      Physical Exam:  General: NAD  CV: RR  Lungs: breathing comfortably on RA  Abdomen: soft, gravid, non-tender  Ext: no pain or swelling    Labs:             11.2<L>  9.89  )-----------( 332      ( 07-22 @ 17:14 )             35.0         MEDICATIONS  (STANDING):  dextrose 5%. 1000 milliLiter(s) (50 mL/Hr) IV Continuous <Continuous>  dextrose 5%. 1000 milliLiter(s) (100 mL/Hr) IV Continuous <Continuous>  dextrose 50% Injectable 25 Gram(s) IV Push once  dextrose 50% Injectable 12.5 Gram(s) IV Push once  dextrose 50% Injectable 25 Gram(s) IV Push once  famotidine    Tablet 20 milliGRAM(s) Oral two times a day  glucagon  Injectable 1 milliGRAM(s) IntraMuscular once  heparin   Injectable 5000 Unit(s) SubCutaneous every 12 hours  insulin detemir injectable (LEVEMIR) 28 Unit(s) SubCutaneous before breakfast  insulin detemir injectable (LEVEMIR) 48 Unit(s) SubCutaneous at bedtime  insulin lispro Injectable (ADMELOG) 8 Unit(s) SubCutaneous before breakfast  insulin lispro Injectable (ADMELOG) 10 Unit(s) SubCutaneous before lunch  insulin lispro Injectable (ADMELOG) 12 Unit(s) SubCutaneous before dinner  prenatal multivitamin 1 Tablet(s) Oral daily    MEDICATIONS  (PRN):  dextrose Oral Gel 15 Gram(s) Oral once PRN Blood Glucose LESS THAN 70 milliGRAM(s)/deciliter       Patient seen and examined at bedside, no acute overnight events. No acute complaints. Patient endorses good fetal movement. Patient is ambulating and tolerating regular diet. Denies CP, SOB, N/V, fevers, chills, or any other concerns.    Vital Signs Last 24 Hours  T(C): 36.8 (07-23-23 @ 01:17), Max: 37.0 (07-22-23 @ 13:29)  HR: 92 (07-23-23 @ 01:17) (85 - 104)  BP: 115/58 (07-23-23 @ 01:17) (108/66 - 133/78)  RR: 18 (07-23-23 @ 01:17) (16 - 19)  SpO2: 97% (07-23-23 @ 01:17) (95% - 97%)    I&O's Summary      Physical Exam:  General: NAD  CV: RR  Lungs: breathing comfortably on RA  Abdomen: soft, gravid, non-tender  Ext: no pain or swelling    Labs:             11.2<L>  9.89  )-----------( 332      ( 07-22 @ 17:14 )             35.0         MEDICATIONS  (STANDING):  dextrose 5%. 1000 milliLiter(s) (50 mL/Hr) IV Continuous <Continuous>  dextrose 5%. 1000 milliLiter(s) (100 mL/Hr) IV Continuous <Continuous>  dextrose 50% Injectable 25 Gram(s) IV Push once  dextrose 50% Injectable 12.5 Gram(s) IV Push once  dextrose 50% Injectable 25 Gram(s) IV Push once  famotidine    Tablet 20 milliGRAM(s) Oral two times a day  glucagon  Injectable 1 milliGRAM(s) IntraMuscular once  heparin   Injectable 5000 Unit(s) SubCutaneous every 12 hours  insulin detemir injectable (LEVEMIR) 28 Unit(s) SubCutaneous before breakfast  insulin detemir injectable (LEVEMIR) 48 Unit(s) SubCutaneous at bedtime  insulin lispro Injectable (ADMELOG) 8 Unit(s) SubCutaneous before breakfast  insulin lispro Injectable (ADMELOG) 10 Unit(s) SubCutaneous before lunch  insulin lispro Injectable (ADMELOG) 12 Unit(s) SubCutaneous before dinner  prenatal multivitamin 1 Tablet(s) Oral daily    MEDICATIONS  (PRN):  dextrose Oral Gel 15 Gram(s) Oral once PRN Blood Glucose LESS THAN 70 milliGRAM(s)/deciliter

## 2023-07-23 NOTE — PROGRESS NOTE ADULT - ASSESSMENT
27yo  @36+3wga GDMA2 presenting with decreased fetal movement with BPP 8/8 but oligohydramnios to 3.18. Patient and fetus stable overnight. 27yo  @36+3wga GDMA2 presenting with decreased fetal movement with BPP  but with lower RUBY than expected at 3.18. Patient and fetus stable overnight.    #GDMA2  - FS wnl overnight, ctm  - c/w Levemir , Humalog 8/10/12    #Fetal wellbeing  - f/u GBS  - NST BID  - f/u PNV  - ATU sono in AM    #Maternal wellbeing  - HSQ  - CCD    CHAY Chen, PGY3

## 2023-07-23 NOTE — PROGRESS NOTE ADULT - TIME BILLING
I personally discussed this patient with the NP/PA/Fellow at the time of the visit. I agree with the assessment and plan as written, unless noted below.

## 2023-07-24 ENCOUNTER — APPOINTMENT (OUTPATIENT)
Dept: ANTEPARTUM | Facility: CLINIC | Age: 28
End: 2023-07-24

## 2023-07-24 ENCOUNTER — APPOINTMENT (OUTPATIENT)
Dept: OBGYN | Facility: CLINIC | Age: 28
End: 2023-07-24

## 2023-07-24 LAB
CULTURE RESULTS: SIGNIFICANT CHANGE UP
GLUCOSE BLDC GLUCOMTR-MCNC: 100 MG/DL — HIGH (ref 70–99)
GLUCOSE BLDC GLUCOMTR-MCNC: 111 MG/DL — HIGH (ref 70–99)
GLUCOSE BLDC GLUCOMTR-MCNC: 79 MG/DL — SIGNIFICANT CHANGE UP (ref 70–99)
GLUCOSE BLDC GLUCOMTR-MCNC: 91 MG/DL — SIGNIFICANT CHANGE UP (ref 70–99)
GLUCOSE BLDC GLUCOMTR-MCNC: 92 MG/DL — SIGNIFICANT CHANGE UP (ref 70–99)
GLUCOSE BLDC GLUCOMTR-MCNC: 92 MG/DL — SIGNIFICANT CHANGE UP (ref 70–99)
SPECIMEN SOURCE: SIGNIFICANT CHANGE UP

## 2023-07-24 RX ORDER — SODIUM CHLORIDE 9 MG/ML
500 INJECTION, SOLUTION INTRAVENOUS
Refills: 0 | Status: DISCONTINUED | OUTPATIENT
Start: 2023-07-24 | End: 2023-07-25

## 2023-07-24 RX ORDER — OXYTOCIN 10 UNIT/ML
2 VIAL (ML) INJECTION
Qty: 30 | Refills: 0 | Status: DISCONTINUED | OUTPATIENT
Start: 2023-07-24 | End: 2023-07-25

## 2023-07-24 RX ADMIN — Medication 108 GRAM(S): at 09:41

## 2023-07-24 RX ADMIN — Medication 2 MILLIUNIT(S)/MIN: at 19:13

## 2023-07-24 RX ADMIN — SODIUM CHLORIDE 125 MILLILITER(S): 9 INJECTION, SOLUTION INTRAVENOUS at 19:13

## 2023-07-24 RX ADMIN — Medication 108 GRAM(S): at 17:53

## 2023-07-24 RX ADMIN — Medication 2 MILLIUNIT(S)/MIN: at 14:02

## 2023-07-24 RX ADMIN — Medication 108 GRAM(S): at 14:00

## 2023-07-24 RX ADMIN — CHLORHEXIDINE GLUCONATE 1 APPLICATION(S): 213 SOLUTION TOPICAL at 17:53

## 2023-07-24 RX ADMIN — SODIUM CHLORIDE 125 MILLILITER(S): 9 INJECTION, SOLUTION INTRAVENOUS at 19:14

## 2023-07-24 RX ADMIN — Medication 108 GRAM(S): at 05:45

## 2023-07-24 NOTE — OB PROVIDER LABOR PROGRESS NOTE - ASSESSMENT
28 y.o  @ 36w4d IOL for Oligo, GDMA2, now s/p cervical balloon    -patient making cervical change  -s/p PO Cytotec, last dose @ 11:45  -continue efm/toco  -continue Amp for unknown GBS  -for Pitocin @ 13:35    d/w Dr. Génesis Reyes NP

## 2023-07-24 NOTE — OB PROVIDER LABOR PROGRESS NOTE - ASSESSMENT
27 y/o  @36+4 oligo iol c/b a2 no cervical change    approved for top off  For iupc once pt comfortable  dw dr kyra stone, NP

## 2023-07-24 NOTE — OB PROVIDER LABOR PROGRESS NOTE - ASSESSMENT
27y/o  @36w4d in stable condition    Plan:    - Con't IOL w/ Pit  - Anesthesia notified for pain medication  - Continuous EFM, Roma-> IUPC and ISE  - Con't IVF    Sherie Najera  PGY-1

## 2023-07-24 NOTE — PROGRESS NOTE ADULT - SUBJECTIVE AND OBJECTIVE BOX
Pt examined about 20 minutes ago to place internal monitors  VE 5/100/-3  IUPC and FSE placed  Pt currently having late decels - will rescucitate  will continue to monitor  consider c/s if tracing does not improve   RAMONA Murphy MD

## 2023-07-25 ENCOUNTER — TRANSCRIPTION ENCOUNTER (OUTPATIENT)
Age: 28
End: 2023-07-25

## 2023-07-25 LAB
ALBUMIN SERPL ELPH-MCNC: 2.8 G/DL — LOW (ref 3.3–5)
ALBUMIN SERPL ELPH-MCNC: 2.9 G/DL — LOW (ref 3.3–5)
ALP SERPL-CCNC: 173 U/L — HIGH (ref 40–120)
ALP SERPL-CCNC: 177 U/L — HIGH (ref 40–120)
ALT FLD-CCNC: 10 U/L — SIGNIFICANT CHANGE UP (ref 4–33)
ALT FLD-CCNC: 12 U/L — SIGNIFICANT CHANGE UP (ref 4–33)
ANION GAP SERPL CALC-SCNC: 16 MMOL/L — HIGH (ref 7–14)
ANION GAP SERPL CALC-SCNC: 17 MMOL/L — HIGH (ref 7–14)
ANISOCYTOSIS BLD QL: SLIGHT — SIGNIFICANT CHANGE UP
APPEARANCE UR: ABNORMAL
APTT BLD: 26 SEC — LOW (ref 27–36.3)
APTT BLD: 26.2 SEC — LOW (ref 27–36.3)
AST SERPL-CCNC: 19 U/L — SIGNIFICANT CHANGE UP (ref 4–32)
AST SERPL-CCNC: 26 U/L — SIGNIFICANT CHANGE UP (ref 4–32)
BACTERIA # UR AUTO: NEGATIVE /HPF — SIGNIFICANT CHANGE UP
BASOPHILS # BLD AUTO: 0.06 K/UL — SIGNIFICANT CHANGE UP (ref 0–0.2)
BASOPHILS # BLD AUTO: 0.19 K/UL — SIGNIFICANT CHANGE UP (ref 0–0.2)
BASOPHILS NFR BLD AUTO: 0.2 % — SIGNIFICANT CHANGE UP (ref 0–2)
BASOPHILS NFR BLD AUTO: 0.9 % — SIGNIFICANT CHANGE UP (ref 0–2)
BILIRUB SERPL-MCNC: 0.3 MG/DL — SIGNIFICANT CHANGE UP (ref 0.2–1.2)
BILIRUB SERPL-MCNC: 0.3 MG/DL — SIGNIFICANT CHANGE UP (ref 0.2–1.2)
BILIRUB UR-MCNC: NEGATIVE — SIGNIFICANT CHANGE UP
BUN SERPL-MCNC: 5 MG/DL — LOW (ref 7–23)
BUN SERPL-MCNC: 5 MG/DL — LOW (ref 7–23)
CALCIUM SERPL-MCNC: 8.6 MG/DL — SIGNIFICANT CHANGE UP (ref 8.4–10.5)
CALCIUM SERPL-MCNC: 8.8 MG/DL — SIGNIFICANT CHANGE UP (ref 8.4–10.5)
CAST: 1 /LPF — SIGNIFICANT CHANGE UP (ref 0–4)
CHLORIDE SERPL-SCNC: 104 MMOL/L — SIGNIFICANT CHANGE UP (ref 98–107)
CHLORIDE SERPL-SCNC: 104 MMOL/L — SIGNIFICANT CHANGE UP (ref 98–107)
CO2 SERPL-SCNC: 16 MMOL/L — LOW (ref 22–31)
CO2 SERPL-SCNC: 17 MMOL/L — LOW (ref 22–31)
COLOR SPEC: YELLOW — SIGNIFICANT CHANGE UP
CREAT ?TM UR-MCNC: 65 MG/DL — SIGNIFICANT CHANGE UP
CREAT SERPL-MCNC: 0.66 MG/DL — SIGNIFICANT CHANGE UP (ref 0.5–1.3)
CREAT SERPL-MCNC: 0.7 MG/DL — SIGNIFICANT CHANGE UP (ref 0.5–1.3)
DIFF PNL FLD: ABNORMAL
EGFR: 120 ML/MIN/1.73M2 — SIGNIFICANT CHANGE UP
EGFR: 122 ML/MIN/1.73M2 — SIGNIFICANT CHANGE UP
EOSINOPHIL # BLD AUTO: 0 K/UL — SIGNIFICANT CHANGE UP (ref 0–0.5)
EOSINOPHIL # BLD AUTO: 0 K/UL — SIGNIFICANT CHANGE UP (ref 0–0.5)
EOSINOPHIL NFR BLD AUTO: 0 % — SIGNIFICANT CHANGE UP (ref 0–6)
EOSINOPHIL NFR BLD AUTO: 0 % — SIGNIFICANT CHANGE UP (ref 0–6)
FIBRINOGEN PPP-MCNC: 586 MG/DL — HIGH (ref 200–465)
FIBRINOGEN PPP-MCNC: 659 MG/DL — HIGH (ref 200–465)
GLUCOSE BLDC GLUCOMTR-MCNC: 119 MG/DL — HIGH (ref 70–99)
GLUCOSE BLDC GLUCOMTR-MCNC: 132 MG/DL — HIGH (ref 70–99)
GLUCOSE BLDC GLUCOMTR-MCNC: 135 MG/DL — HIGH (ref 70–99)
GLUCOSE BLDC GLUCOMTR-MCNC: 88 MG/DL — SIGNIFICANT CHANGE UP (ref 70–99)
GLUCOSE SERPL-MCNC: 129 MG/DL — HIGH (ref 70–99)
GLUCOSE SERPL-MCNC: 167 MG/DL — HIGH (ref 70–99)
GLUCOSE UR QL: NEGATIVE MG/DL — SIGNIFICANT CHANGE UP
HCT VFR BLD CALC: 31.4 % — LOW (ref 34.5–45)
HCT VFR BLD CALC: 34.7 % — SIGNIFICANT CHANGE UP (ref 34.5–45)
HGB BLD-MCNC: 10.2 G/DL — LOW (ref 11.5–15.5)
HGB BLD-MCNC: 11.1 G/DL — LOW (ref 11.5–15.5)
IANC: 15.52 K/UL — HIGH (ref 1.8–7.4)
IANC: 25.07 K/UL — HIGH (ref 1.8–7.4)
IMM GRANULOCYTES NFR BLD AUTO: 2.1 % — HIGH (ref 0–0.9)
INR BLD: 0.99 RATIO — SIGNIFICANT CHANGE UP (ref 0.88–1.16)
INR BLD: 0.99 RATIO — SIGNIFICANT CHANGE UP (ref 0.88–1.16)
KETONES UR-MCNC: 40 MG/DL
LDH SERPL L TO P-CCNC: 169 U/L — SIGNIFICANT CHANGE UP (ref 135–225)
LDH SERPL L TO P-CCNC: 201 U/L — SIGNIFICANT CHANGE UP (ref 135–225)
LEUKOCYTE ESTERASE UR-ACNC: ABNORMAL
LYMPHOCYTES # BLD AUTO: 0.97 K/UL — LOW (ref 1–3.3)
LYMPHOCYTES # BLD AUTO: 15.5 % — SIGNIFICANT CHANGE UP (ref 13–44)
LYMPHOCYTES # BLD AUTO: 3.21 K/UL — SIGNIFICANT CHANGE UP (ref 1–3.3)
LYMPHOCYTES # BLD AUTO: 3.3 % — LOW (ref 13–44)
MAGNESIUM SERPL-MCNC: 4.2 MG/DL — HIGH (ref 1.6–2.6)
MAGNESIUM SERPL-MCNC: 4.2 MG/DL — HIGH (ref 1.6–2.6)
MANUAL SMEAR VERIFICATION: SIGNIFICANT CHANGE UP
MCHC RBC-ENTMCNC: 26.7 PG — LOW (ref 27–34)
MCHC RBC-ENTMCNC: 26.8 PG — LOW (ref 27–34)
MCHC RBC-ENTMCNC: 32 GM/DL — SIGNIFICANT CHANGE UP (ref 32–36)
MCHC RBC-ENTMCNC: 32.5 GM/DL — SIGNIFICANT CHANGE UP (ref 32–36)
MCV RBC AUTO: 82.6 FL — SIGNIFICANT CHANGE UP (ref 80–100)
MCV RBC AUTO: 83.6 FL — SIGNIFICANT CHANGE UP (ref 80–100)
MONOCYTES # BLD AUTO: 0.54 K/UL — SIGNIFICANT CHANGE UP (ref 0–0.9)
MONOCYTES # BLD AUTO: 2.33 K/UL — HIGH (ref 0–0.9)
MONOCYTES NFR BLD AUTO: 2.6 % — SIGNIFICANT CHANGE UP (ref 2–14)
MONOCYTES NFR BLD AUTO: 8 % — SIGNIFICANT CHANGE UP (ref 2–14)
NEUTROPHILS # BLD AUTO: 16.43 K/UL — HIGH (ref 1.8–7.4)
NEUTROPHILS # BLD AUTO: 25.07 K/UL — HIGH (ref 1.8–7.4)
NEUTROPHILS NFR BLD AUTO: 79.3 % — HIGH (ref 43–77)
NEUTROPHILS NFR BLD AUTO: 86.4 % — HIGH (ref 43–77)
NITRITE UR-MCNC: NEGATIVE — SIGNIFICANT CHANGE UP
NRBC # BLD: 0 /100 WBCS — SIGNIFICANT CHANGE UP (ref 0–0)
NRBC # FLD: 0 K/UL — SIGNIFICANT CHANGE UP (ref 0–0)
PH UR: 6 — SIGNIFICANT CHANGE UP (ref 5–8)
PLAT MORPH BLD: NORMAL — SIGNIFICANT CHANGE UP
PLATELET # BLD AUTO: 319 K/UL — SIGNIFICANT CHANGE UP (ref 150–400)
PLATELET # BLD AUTO: 322 K/UL — SIGNIFICANT CHANGE UP (ref 150–400)
PLATELET COUNT - ESTIMATE: NORMAL — SIGNIFICANT CHANGE UP
POLYCHROMASIA BLD QL SMEAR: SLIGHT — SIGNIFICANT CHANGE UP
POTASSIUM SERPL-MCNC: 3.6 MMOL/L — SIGNIFICANT CHANGE UP (ref 3.5–5.3)
POTASSIUM SERPL-MCNC: 3.8 MMOL/L — SIGNIFICANT CHANGE UP (ref 3.5–5.3)
POTASSIUM SERPL-SCNC: 3.6 MMOL/L — SIGNIFICANT CHANGE UP (ref 3.5–5.3)
POTASSIUM SERPL-SCNC: 3.8 MMOL/L — SIGNIFICANT CHANGE UP (ref 3.5–5.3)
PROT ?TM UR-MCNC: 42 MG/DL — SIGNIFICANT CHANGE UP
PROT ?TM UR-MCNC: 42 MG/DL — SIGNIFICANT CHANGE UP
PROT SERPL-MCNC: 5.2 G/DL — LOW (ref 6–8.3)
PROT SERPL-MCNC: 5.9 G/DL — LOW (ref 6–8.3)
PROT UR-MCNC: 30 MG/DL
PROT/CREAT UR-RTO: 0.6 RATIO — HIGH (ref 0–0.2)
PROTHROM AB SERPL-ACNC: 11.5 SEC — SIGNIFICANT CHANGE UP (ref 10.5–13.4)
PROTHROM AB SERPL-ACNC: 11.5 SEC — SIGNIFICANT CHANGE UP (ref 10.5–13.4)
RBC # BLD: 3.8 M/UL — SIGNIFICANT CHANGE UP (ref 3.8–5.2)
RBC # BLD: 4.15 M/UL — SIGNIFICANT CHANGE UP (ref 3.8–5.2)
RBC # FLD: 14.5 % — SIGNIFICANT CHANGE UP (ref 10.3–14.5)
RBC # FLD: 14.6 % — HIGH (ref 10.3–14.5)
RBC BLD AUTO: ABNORMAL
RBC CASTS # UR COMP ASSIST: 14 /HPF — HIGH (ref 0–4)
SODIUM SERPL-SCNC: 137 MMOL/L — SIGNIFICANT CHANGE UP (ref 135–145)
SODIUM SERPL-SCNC: 137 MMOL/L — SIGNIFICANT CHANGE UP (ref 135–145)
SP GR SPEC: 1.01 — SIGNIFICANT CHANGE UP (ref 1–1.03)
SQUAMOUS # UR AUTO: 1 /HPF — SIGNIFICANT CHANGE UP (ref 0–5)
URATE SERPL-MCNC: 5.2 MG/DL — SIGNIFICANT CHANGE UP (ref 2.5–7)
URATE SERPL-MCNC: 5.7 MG/DL — SIGNIFICANT CHANGE UP (ref 2.5–7)
UROBILINOGEN FLD QL: 0.2 MG/DL — SIGNIFICANT CHANGE UP (ref 0.2–1)
VARIANT LYMPHS # BLD: 1.7 % — SIGNIFICANT CHANGE UP (ref 0–6)
WBC # BLD: 20.72 K/UL — HIGH (ref 3.8–10.5)
WBC # BLD: 29.05 K/UL — HIGH (ref 3.8–10.5)
WBC # FLD AUTO: 20.72 K/UL — HIGH (ref 3.8–10.5)
WBC # FLD AUTO: 29.05 K/UL — HIGH (ref 3.8–10.5)
WBC UR QL: 6 /HPF — HIGH (ref 0–5)

## 2023-07-25 PROCEDURE — 59400 OBSTETRICAL CARE: CPT | Mod: U7,GC

## 2023-07-25 RX ORDER — MAGNESIUM SULFATE 500 MG/ML
4 VIAL (ML) INJECTION ONCE
Refills: 0 | Status: COMPLETED | OUTPATIENT
Start: 2023-07-25 | End: 2023-07-25

## 2023-07-25 RX ORDER — OXYCODONE HYDROCHLORIDE 5 MG/1
5 TABLET ORAL ONCE
Refills: 0 | Status: DISCONTINUED | OUTPATIENT
Start: 2023-07-25 | End: 2023-07-29

## 2023-07-25 RX ORDER — HYDROCORTISONE 1 %
1 OINTMENT (GRAM) TOPICAL EVERY 6 HOURS
Refills: 0 | Status: DISCONTINUED | OUTPATIENT
Start: 2023-07-25 | End: 2023-07-29

## 2023-07-25 RX ORDER — IBUPROFEN 200 MG
600 TABLET ORAL EVERY 6 HOURS
Refills: 0 | Status: COMPLETED | OUTPATIENT
Start: 2023-07-25 | End: 2024-06-22

## 2023-07-25 RX ORDER — ACETAMINOPHEN 500 MG
975 TABLET ORAL
Refills: 0 | Status: DISCONTINUED | OUTPATIENT
Start: 2023-07-25 | End: 2023-07-29

## 2023-07-25 RX ORDER — SODIUM CHLORIDE 9 MG/ML
500 INJECTION, SOLUTION INTRAVENOUS ONCE
Refills: 0 | Status: COMPLETED | OUTPATIENT
Start: 2023-07-25 | End: 2023-07-25

## 2023-07-25 RX ORDER — MAGNESIUM SULFATE 500 MG/ML
2 VIAL (ML) INJECTION
Qty: 40 | Refills: 0 | Status: DISCONTINUED | OUTPATIENT
Start: 2023-07-25 | End: 2023-07-25

## 2023-07-25 RX ORDER — AER TRAVELER 0.5 G/1
1 SOLUTION RECTAL; TOPICAL EVERY 4 HOURS
Refills: 0 | Status: DISCONTINUED | OUTPATIENT
Start: 2023-07-25 | End: 2023-07-29

## 2023-07-25 RX ORDER — IBUPROFEN 200 MG
600 TABLET ORAL EVERY 6 HOURS
Refills: 0 | Status: DISCONTINUED | OUTPATIENT
Start: 2023-07-25 | End: 2023-07-29

## 2023-07-25 RX ORDER — SODIUM CHLORIDE 9 MG/ML
1000 INJECTION, SOLUTION INTRAVENOUS
Refills: 0 | Status: DISCONTINUED | OUTPATIENT
Start: 2023-07-25 | End: 2023-07-26

## 2023-07-25 RX ORDER — OXYTOCIN 10 UNIT/ML
41.67 VIAL (ML) INJECTION
Qty: 20 | Refills: 0 | Status: DISCONTINUED | OUTPATIENT
Start: 2023-07-25 | End: 2023-07-27

## 2023-07-25 RX ORDER — KETOROLAC TROMETHAMINE 30 MG/ML
30 SYRINGE (ML) INJECTION ONCE
Refills: 0 | Status: DISCONTINUED | OUTPATIENT
Start: 2023-07-25 | End: 2023-07-25

## 2023-07-25 RX ORDER — OXYCODONE HYDROCHLORIDE 5 MG/1
5 TABLET ORAL
Refills: 0 | Status: DISCONTINUED | OUTPATIENT
Start: 2023-07-25 | End: 2023-07-29

## 2023-07-25 RX ORDER — DIPHENHYDRAMINE HCL 50 MG
25 CAPSULE ORAL EVERY 6 HOURS
Refills: 0 | Status: DISCONTINUED | OUTPATIENT
Start: 2023-07-25 | End: 2023-07-29

## 2023-07-25 RX ORDER — BENZOCAINE 10 %
1 GEL (GRAM) MUCOUS MEMBRANE EVERY 6 HOURS
Refills: 0 | Status: DISCONTINUED | OUTPATIENT
Start: 2023-07-25 | End: 2023-07-29

## 2023-07-25 RX ORDER — SODIUM CHLORIDE 9 MG/ML
3 INJECTION INTRAMUSCULAR; INTRAVENOUS; SUBCUTANEOUS EVERY 8 HOURS
Refills: 0 | Status: DISCONTINUED | OUTPATIENT
Start: 2023-07-25 | End: 2023-07-29

## 2023-07-25 RX ORDER — SIMETHICONE 80 MG/1
80 TABLET, CHEWABLE ORAL EVERY 4 HOURS
Refills: 0 | Status: DISCONTINUED | OUTPATIENT
Start: 2023-07-25 | End: 2023-07-29

## 2023-07-25 RX ORDER — DIPHENHYDRAMINE HCL 50 MG
25 CAPSULE ORAL ONCE
Refills: 0 | Status: COMPLETED | OUTPATIENT
Start: 2023-07-25 | End: 2023-07-25

## 2023-07-25 RX ORDER — DIBUCAINE 1 %
1 OINTMENT (GRAM) RECTAL EVERY 6 HOURS
Refills: 0 | Status: DISCONTINUED | OUTPATIENT
Start: 2023-07-25 | End: 2023-07-29

## 2023-07-25 RX ORDER — LABETALOL HCL 100 MG
20 TABLET ORAL ONCE
Refills: 0 | Status: COMPLETED | OUTPATIENT
Start: 2023-07-25 | End: 2023-07-25

## 2023-07-25 RX ORDER — TETANUS TOXOID, REDUCED DIPHTHERIA TOXOID AND ACELLULAR PERTUSSIS VACCINE, ADSORBED 5; 2.5; 8; 8; 2.5 [IU]/.5ML; [IU]/.5ML; UG/.5ML; UG/.5ML; UG/.5ML
0.5 SUSPENSION INTRAMUSCULAR ONCE
Refills: 0 | Status: DISCONTINUED | OUTPATIENT
Start: 2023-07-25 | End: 2023-07-29

## 2023-07-25 RX ORDER — PRAMOXINE HYDROCHLORIDE 150 MG/15G
1 AEROSOL, FOAM RECTAL EVERY 4 HOURS
Refills: 0 | Status: DISCONTINUED | OUTPATIENT
Start: 2023-07-25 | End: 2023-07-29

## 2023-07-25 RX ORDER — MAGNESIUM SULFATE 500 MG/ML
2 VIAL (ML) INJECTION
Qty: 40 | Refills: 0 | Status: DISCONTINUED | OUTPATIENT
Start: 2023-07-25 | End: 2023-07-26

## 2023-07-25 RX ORDER — NIFEDIPINE 30 MG
30 TABLET, EXTENDED RELEASE 24 HR ORAL DAILY
Refills: 0 | Status: DISCONTINUED | OUTPATIENT
Start: 2023-07-25 | End: 2023-07-29

## 2023-07-25 RX ORDER — MAGNESIUM HYDROXIDE 400 MG/1
30 TABLET, CHEWABLE ORAL
Refills: 0 | Status: DISCONTINUED | OUTPATIENT
Start: 2023-07-25 | End: 2023-07-29

## 2023-07-25 RX ORDER — LANOLIN
1 OINTMENT (GRAM) TOPICAL EVERY 6 HOURS
Refills: 0 | Status: DISCONTINUED | OUTPATIENT
Start: 2023-07-25 | End: 2023-07-29

## 2023-07-25 RX ADMIN — Medication 975 MILLIGRAM(S): at 22:00

## 2023-07-25 RX ADMIN — Medication 975 MILLIGRAM(S): at 21:34

## 2023-07-25 RX ADMIN — SODIUM CHLORIDE 1000 MILLILITER(S): 9 INJECTION, SOLUTION INTRAVENOUS at 10:56

## 2023-07-25 RX ADMIN — Medication 50 GM/HR: at 04:06

## 2023-07-25 RX ADMIN — SODIUM CHLORIDE 3 MILLILITER(S): 9 INJECTION INTRAMUSCULAR; INTRAVENOUS; SUBCUTANEOUS at 23:32

## 2023-07-25 RX ADMIN — Medication 30 MILLIGRAM(S): at 05:15

## 2023-07-25 RX ADMIN — Medication 30 MILLIGRAM(S): at 10:20

## 2023-07-25 RX ADMIN — Medication 30 MILLIGRAM(S): at 10:50

## 2023-07-25 RX ADMIN — SODIUM CHLORIDE 3 MILLILITER(S): 9 INJECTION INTRAMUSCULAR; INTRAVENOUS; SUBCUTANEOUS at 18:23

## 2023-07-25 RX ADMIN — Medication 25 MILLIGRAM(S): at 01:25

## 2023-07-25 RX ADMIN — Medication 50 GM/HR: at 19:57

## 2023-07-25 RX ADMIN — Medication 125 MILLIUNIT(S)/MIN: at 09:55

## 2023-07-25 RX ADMIN — Medication 50 GM/HR: at 07:08

## 2023-07-25 RX ADMIN — Medication 600 MILLIGRAM(S): at 17:41

## 2023-07-25 RX ADMIN — Medication 20 MILLIGRAM(S): at 03:58

## 2023-07-25 RX ADMIN — Medication 50 GM/HR: at 15:09

## 2023-07-25 RX ADMIN — Medication 300 GRAM(S): at 04:05

## 2023-07-25 RX ADMIN — Medication 600 MILLIGRAM(S): at 16:41

## 2023-07-25 NOTE — DISCHARGE NOTE OB - PATIENT PORTAL LINK FT
You can access the FollowMyHealth Patient Portal offered by [facility] by registering at the following website: [email or web address]. By joining [facility]’s FollowMyHealth portal, you will also be able to view your health information using other applications (apps) compatible with our system.

## 2023-07-25 NOTE — DISCHARGE NOTE OB - NS MD DC FALL RISK RISK
For information on Fall & Injury Prevention, visit: [email or web address] OR  [email or web address] OR  https://www.cdc.gov/steadi/patient.html

## 2023-07-25 NOTE — OB PROVIDER DELIVERY SUMMARY - NSSELHIDDEN_OBGYN_ALL_OB_FT
[NS_DeliveryAttending1_OBGYN_ALL_OB_FT:[id number]],[NS_DeliveryAssist1_OBGYN_ALL_OB_FT:[id number]]

## 2023-07-25 NOTE — OB NEONATOLOGY/PEDIATRICIAN DELIVERY SUMMARY - NSPEDSNEONOTESA_OBGYN_ALL_OB_FT
Called by OBGYN to attend  due to category 2 NR FHR tracing. Baby is product of a 36+5 week gestation born to a  28 y.o. F. Maternal labs include Blood Type A+, HIV neg, RPR NR, Hep B neg, GBS neg on . Maternal history is significant for GDMA2 and oligohydramnios.. Pregnancy was complicated by decreased fetal movement and oligohydramnios. ROM on  at 0600 with clear fluid. Delivery complicated by suprapubic L anterior shoulder. Baby emerged crying and vigorous. Infant was brought to radiant warmer and warmed, dried, stimulated and suctioned. HR>100, normal respiratory effort with APGARS of 8/9.  Highest maternal temperature **** EOS score: ***. Admit to ***. Temperature prior to transfer: *** C. Mom plans to initiate breastfeeding/formula feed, consents/declines Hep B vaccine and consents/declines circ.  BW:  :  TOB: Called by OBGYN to attend  due to category 2 NR FHR tracing. Baby is product of a 36+5 week gestation born to a  29 y.o. F. Maternal labs include Blood Type A+, HIV neg, RPR NR, Hep B neg, GBS neg on . Maternal history is significant for GDMA2 and oligohydramnios.. Pregnancy was complicated by LGA, decreased fetal movement and oligohydramnios. ROM on  at 0600 with clear fluid. Delivery complicated by suprapubic L anterior shoulder. Baby emerged crying and vigorous. Infant was brought to radiant warmer and warmed, dried, stimulated and suctioned. HR>100, normal respiratory effort with APGARS of 8/9.  Highest maternal temperature 37.1 C, EOS score: 0.47. Admit to NBN. Mom plans to initiate breastfeeding, and consents to Hep B vaccine.  BW: 3395 g  : 23   TOB: 0724    Physical Exam:  Gen: NAD, +grimace  HEENT: anterior fontanel open soft and flat,  caput succedaneum present,  no cleft lip/palate, ears normal set, no ear pits or tags. no lesions in mouth/throat, nares clinically patent  Resp: no increased work of breathing, good air entry b/l, clear to auscultation bilaterally  Cardio: Normal S1/S2, regular rate and rhythm, no murmurs, rubs or gallops  Abd: soft, non tender, non distended, + bowel sounds, umbilical cord with 3 vessels  Neuro: +grasp/suck/néstor, normal tone  Extremities: negative guy and ortolani, moving all extremities, full range of motion x 4, no crepitus  Skin: pink, warm  Genitals: Normal female anatomy

## 2023-07-25 NOTE — OB PROVIDER LABOR PROGRESS NOTE - ASSESSMENT
Plan:  28y P0  IOL   - Continue pitocin  - repositioning  - just received re-dosed epidural     D/w Dr. Jeffrey Bansal PGY-1 Plan:  28y P0  IOL   - Continue pitocin  - repositioning  -just received top off    D/w Dr. Jeffrey Bansal PGY-1

## 2023-07-25 NOTE — OB PROVIDER LABOR PROGRESS NOTE - ASSESSMENT
- cervix swollen, will give IV Benadryl  - Pitocin not currently adequate, will continue to uptitrate. IUPC working.   - epidural top off  - cont to monitor     D/w Dr. Jeffrey Caal-Garcia PGY3

## 2023-07-25 NOTE — DISCHARGE NOTE OB - PLAN OF CARE
pelvic rest  follow up with Dr. Burk in 1 week for BP check After discharge, please stay on pelvic rest for 6 weeks, meaning no sexual intercourse, no tampons and no douching.  No driving for 2 weeks.  No lifting objects heavier than baby for 2 weeks.  Expect to have vaginal bleeding/spotting for up to six weeks.  The bleeding should get lighter and more white/light brown with time.  For bleeding soaking more than a pad an hour or passing clots greater than the size of your fist, come in to the   emergency department.  Follow up in the office in 6 weeks -F/U in 6 weeks for repeat oral glucose Tolerance Test  your blood pressure monitor from [facility] on 1st floor of [facility]. Follow-up in your OB office within 1 week for a blood pressure check.   Please take your blood pressure 3x/day. Call your doctor if your blood pressure is 140 (top number) OR 90 (bottom number) or higher. Return to hospital if your blood pressure is 160 (top number)  (bottom number) or higher. Call your doctor if you experience symptoms such as headache, blurry vision, epigastric pain, or nausea/vomiting.

## 2023-07-25 NOTE — OB PROVIDER LABOR PROGRESS NOTE - NS_OBIHIFHRDETAILS_OBGYN_ALL_OB_FT
155 bpm, moderate variability, +accels, variable decelerations nonrecurrent
EFM:  180/mod. variability/+accels/-decels  [place]: q3-4min
cat 1
140  Moderate  Accels  No decels  Cat 1
145/mod [place]/- accels/occasional variable
EFM: 150/mod. variability/+accels/-decels  [place]: q2-4 min
EFM: 135/mod. variability/+accels/-decels  [place]: q5-7 min
EFM: 165/mod. variability/+accels/late decelerations  [place]: q1 min

## 2023-07-25 NOTE — OB PROVIDER DELIVERY SUMMARY - NSPROVIDERDELIVERYNOTE_OBGYN_ALL_OB_FT
Pt found to be fully dilated with urge to push,  of viable female infant. Head, shoulders and body delivered in OA position c/f shoulder dystocia. Suprapubic pressure and delivery of posterior shoulder preformed successfully.  Cord clamping was delayed and then cut uncomplicated. Placenta delivered intact. Vaginal exam revealed intact cervix, vaginal walls, sulci. 2nd degree laceration identified and repaired in usual fashion with chromic suture. Bimanual exam performed, IM pit and rectal cytotec given. Fundus and lower uterine segment firm.   Tonny Douglass PGY2

## 2023-07-25 NOTE — CHART NOTE - NSCHARTNOTEFT_GEN_A_CORE
R2 Event Note    Upon review of BPs, patient noted to meet criteria for PEC w/ SF by sustained SRBPs. States she is in a lot of pain and has been waiting for a top off from anesthesia. Denies headaches, fevers, chills, changes in vision, nausea, vomiting, or RUQ pain.    O:  T(C): 36.6 (23 @ 02:00), Max: 37.1 (23 @ 16:37)  HR: 115 (23 @ 04:07) (86 - 124)  BP: 137/101 (23 @ 03:59) (103/56 - 190/102)  RR: 17 (23 @ 02:00) (16 - 18)  SpO2: 100% (23 @ 04:07) (93% - 100%)    A/P: 29yo  at 36w5d IOL for RUBY 3.2, A2, now meeting criteria for sPEC  - f/u STAT HELLP labs  - Mg infusion for seizure ppx x24h PP  - 20mg labetalol IVP STAT  - Continue to monitor BPs    D/w Dr. Jeffrey Alvarado, PGY-2 R2 Event Note    Upon review of BPs, patient noted to meet criteria for PEC w/ SF by sustained SRBPs. States she is in a lot of pain and has been waiting for a top off from anesthesia. Denies headaches, fevers, chills, changes in vision, nausea, vomiting, or RUQ pain.    O:  T(C): 36.6 (23 @ 02:00), Max: 37.1 (23 @ 16:37)  HR: 115 (23 @ 04:07) (86 - 124)  BP: 137/101 (23 @ 03:59) (103/56 - 190/102)  RR: 17 (23 @ 02:00) (16 - 18)  SpO2: 100% (23 @ 04:07) (93% - 100%)    A/P: 27yo  at 36w5d IOL for RUBY 3.2, A2, now meeting criteria for sPEC  - f/u STAT HELLP labs  - Mg infusion for seizure ppx x24h PP  - 20mg labetalol IVP STAT  - Begin Procardia 30XL qd  - Continue to monitor BPs    D/w Dr. Jeffrey Alvarado, PGY-2

## 2023-07-25 NOTE — DISCHARGE NOTE OB - CARE PLAN
Principal Discharge DX:	Vaginal delivery  Assessment and plan of treatment:	pelvic rest  follow up with Dr. Burk in 1 week for BP check  Secondary Diagnosis:	Oligohydramnios in third trimester  Secondary Diagnosis:	Gestational diabetes  Secondary Diagnosis:	Severe preeclampsia, third trimester   1 Principal Discharge DX:	Vaginal delivery  Assessment and plan of treatment:	After discharge, please stay on pelvic rest for 6 weeks, meaning no sexual intercourse, no tampons and no douching.  No driving for 2 weeks.  No lifting objects heavier than baby for 2 weeks.  Expect to have vaginal bleeding/spotting for up to six weeks.  The bleeding should get lighter and more white/light brown with time.  For bleeding soaking more than a pad an hour or passing clots greater than the size of your fist, come in to the   emergency department.  Follow up in the office in 6 weeks  Secondary Diagnosis:	Oligohydramnios in third trimester  Secondary Diagnosis:	Gestational diabetes  Assessment and plan of treatment:	-F/U in 6 weeks for repeat oral glucose Tolerance Test  Secondary Diagnosis:	Severe preeclampsia, third trimester  Assessment and plan of treatment:	 your blood pressure monitor from [facility] on 1st floor of [facility]. Follow-up in your OB office within 1 week for a blood pressure check.   Please take your blood pressure 3x/day. Call your doctor if your blood pressure is 140 (top number) OR 90 (bottom number) or higher. Return to hospital if your blood pressure is 160 (top number)  (bottom number) or higher. Call your doctor if you experience symptoms such as headache, blurry vision, epigastric pain, or nausea/vomiting.

## 2023-07-25 NOTE — OB PROVIDER LABOR PROGRESS NOTE - ASSESSMENT
- pitocin to remain paused  - ISE placed  - AI started, 500cc bolus  - pt afebrile via rectal temp  - epidural top off    D/w Dr Faustina Caal-Garcia PGY3

## 2023-07-25 NOTE — DISCHARGE NOTE OB - MATERIALS PROVIDED
[facility] [place] Screening Program/[place]  Immunization Record/Breastfeeding Log/Breastfeeding Mother’s Support Group Information/Guide to Postpartum Care/[facility] Screen Program/Back To Sleep Handout/Shaken Baby Prevention Handout/Breastfeeding Guide and Packet/Birth Certificate Instructions/Discharge Medication Information for Patients and Families Pocket Guide

## 2023-07-25 NOTE — OB PROVIDER DELIVERY SUMMARY - NSPROCMANEUVERSA_OBGYN_ALL_OB
Suprapubic pressure/Delivery of posterior arm Suprapubic pressure/[place] (Legs flexed back)/Delivery of posterior arm/Fundal pressure NOT applied

## 2023-07-25 NOTE — OB PROVIDER LABOR PROGRESS NOTE - NS_SUBJECTIVE/OBJECTIVE_OBGYN_ALL_OB_FT
Labor & Delivery Progress Note     Pt seen & examined at bedside for prolonged 4min decel to elmo of 80s. Patient repositioned, bolus of IVF running, O2 supplementation given through face mask. SVE unchanged from prior 5/90/-3. ISE placed and AI initiated.  meconium noted.     T(C): 36.6 (07-25-23 @ 02:00), Max: 37.1 (07-24-23 @ 16:37)  HR: 98 (07-25-23 @ 02:42) (84 - 123)  BP: 130/86 (07-25-23 @ 02:29) (103/56 - 190/102)  RR: 17 (07-25-23 @ 02:00) (16 - 18)  SpO2: 100% (07-25-23 @ 02:42) (92% - 100%)
Labor & Delivery Progress Note     Pt seen & examined at bedside for placement of cervical balloon.    T(C): 36.3 (07-23-23 @ 15:55), Max: 37.0 (07-23-23 @ 11:28)  HR: 89 (07-23-23 @ 19:56) (82 - 104)  BP: 136/59 (07-23-23 @ 19:56) (108/66 - 136/59)  RR: 16 (07-23-23 @ 15:55) (16 - 19)  SpO2: 99% (07-23-23 @ 09:21) (95% - 100%)
Notified by RN of expulsion of cervical balloon. Patient reports feeling intermittent vaginal pressure, but is overall comfortable. Epidural in place and effective.    Vital Signs Last 24 Hrs  T(C): 36.8 (24 Jul 2023 11:00), Max: 36.9 (24 Jul 2023 00:00)  T(F): 98.24 (24 Jul 2023 11:00), Max: 98.42 (24 Jul 2023 00:00)  HR: 96 (24 Jul 2023 13:02) (82 - 176)  BP: 115/63 (24 Jul 2023 13:01) (101/57 - 136/59)  RR: 16 (24 Jul 2023 11:00) (15 - 17)  SpO2: 97% (24 Jul 2023 13:02) (91% - 100%)
Labor & Delivery Progress Note     Pt seen & examined at bedside for complaints of painful contractions    T(C): 37.0 (07-25-23 @ 04:10), Max: 37.1 (07-24-23 @ 16:37)  HR: 122 (07-25-23 @ 05:58) (86 - 127)  BP: 138/88 (07-25-23 @ 05:59) (103/56 - 190/102)  RR: 17 (07-25-23 @ 04:10) (16 - 18)  SpO2: 100% (07-25-23 @ 05:58) (88% - 100%)
PGY1 Labor & Delivery Progress Note     Pt seen & examined 2/2 to complaints of increased rectal pressure.    SVE: 5/80/-3  EFM: 155/mod. variability/+accels/-decels  [place]: q3-5 min    T(C): 37.1 (07-24-23 @ 18:46), Max: 37.1 (07-24-23 @ 16:37)  HR: 123 (07-24-23 @ 19:37) (82 - 176)  BP: 190/102 (07-24-23 @ 19:35) (101/57 - 190/102)  RR: 16 (07-24-23 @ 18:46) (15 - 18)  SpO2: 97% (07-24-23 @ 19:37) (91% - 100%)
Labor & Delivery Progress Note     Pt seen & examined at bedside for complaints of rectal pressure  T(C): 36.5 (07-25-23 @ 00:15), Max: 37.1 (07-24-23 @ 16:37)  HR: 102 (07-25-23 @ 01:17) (82 - 123)  BP: 103/56 (07-25-23 @ 01:15) (103/56 - 190/102)  RR: 16 (07-25-23 @ 00:15) (15 - 18)  SpO2: 100% (07-25-23 @ 01:17) (91% - 100%)
Patient seen for routine CE, CB still in place, cervix palpably thinner.
Pt examined prior to IOL
Pt seen for increased pressure

## 2023-07-25 NOTE — DISCHARGE NOTE OB - HOSPITAL COURSE
Pt admitted for IOL for oligohydramnios, GDMA2, SPEC.  Vaginal delivery , viable female, shoulder dystocia

## 2023-07-25 NOTE — PROVIDER CONTACT NOTE (OTHER) - ASSESSMENT
While RN was completing orthostatic blood pressure patient was tachycardic. Sitting bp was 138/63 and standing was 114/70. Patient is not symptomatic at this time. RN notifies MD Douglass about findings and states patient is cleared to go up to the postpartum floor

## 2023-07-25 NOTE — OB PROVIDER LABOR PROGRESS NOTE - NSVAGINALEXAM_OBGYN_ALL_OB_DT
23-Jul-2023 19:57
24-Jul-2023 12:37
25-Jul-2023 01:23
25-Jul-2023 02:44
24-Jul-2023 19:30
25-Jul-2023 05:55
24-Jul-2023 06:38
24-Jul-2023 16:23
23-Jul-2023 12:33

## 2023-07-25 NOTE — OB RN DELIVERY SUMMARY - NS_SEPSISRSKCALC_OBGYN_ALL_OB_FT
EOS calculated successfully. EOS Risk Factor: 0.5/1000 live births ([facility] national incidence); GA=36w5d; Temp=98.78; ROM=25.4; GBS='Unknown'; Antibiotics='Broad spectrum antibiotics > 4 hrs prior to birth'

## 2023-07-25 NOTE — OB RN DELIVERY SUMMARY - NSSELHIDDEN_OBGYN_ALL_OB_FT
[NS_DeliveryAttending1_OBGYN_ALL_OB_FT:[id number]],[NS_DeliveryAssist1_OBGYN_ALL_OB_FT:[id number]],[NS_DeliveryRN_OBGYN_ALL_OB_FT:[id number]]

## 2023-07-25 NOTE — DISCHARGE NOTE OB - MEDICATION SUMMARY - MEDICATIONS TO TAKE
I will START or STAY ON the medications listed below when I get home from the hospital:    ibuprofen 600 mg oral tablet  -- 1 tab(s) by mouth every 6 hours as needed for  moderate pain  -- Indication: For pain    acetaminophen 325 mg oral tablet  -- 3 tab(s) by mouth every 6 hours as needed for  mild pain  -- Indication: For pain    ferrous sulfate 325 mg (65 mg elemental iron) oral tablet  -- 1 tab(s) by mouth once a day  -- Indication: For Iron supplement    ascorbic acid 500 mg oral tablet  -- 1 tab(s) by mouth once a day  -- Indication: For Vitamin

## 2023-07-25 NOTE — DISCHARGE NOTE OB - MEDICATION SUMMARY - MEDICATIONS TO STOP TAKING
I will STOP taking the medications listed below when I get home from the hospital:    HumaLOG 100 units/mL injectable solution  -- 10 unit(s) injectable once a day after lunch    HumaLOG 100 units/mL injectable solution  -- 8 unit(s) injectable once a day after breakfast    aspirin 81 mg oral capsule  -- 1 orally

## 2023-07-25 NOTE — DISCHARGE NOTE OB - CARE PROVIDER_API CALL
Tyson Burk  Maternal/Fetal Medicine  [address], [address]  [place] [address]  Phone: [phone]  Fax: [phone]  Follow Up Time:

## 2023-07-26 LAB
APTT BLD: 25.4 SEC — SIGNIFICANT CHANGE UP (ref 24.5–35.6)
BASOPHILS # BLD AUTO: 0.05 K/UL — SIGNIFICANT CHANGE UP (ref 0–0.2)
BASOPHILS NFR BLD AUTO: 0.2 % — SIGNIFICANT CHANGE UP (ref 0–2)
EOSINOPHIL # BLD AUTO: 0.17 K/UL — SIGNIFICANT CHANGE UP (ref 0–0.5)
EOSINOPHIL NFR BLD AUTO: 0.8 % — SIGNIFICANT CHANGE UP (ref 0–6)
FIBRINOGEN PPP-MCNC: 555 MG/DL — HIGH (ref 200–465)
HCT VFR BLD CALC: 26 % — LOW (ref 34.5–45)
HGB BLD-MCNC: 8.4 G/DL — LOW (ref 11.5–15.5)
IANC: 17.3 K/UL — HIGH (ref 1.8–7.4)
IMM GRANULOCYTES NFR BLD AUTO: 0.9 % — SIGNIFICANT CHANGE UP (ref 0–0.9)
INR BLD: <0.9 RATIO — SIGNIFICANT CHANGE UP (ref 0.85–1.18)
LYMPHOCYTES # BLD AUTO: 11.2 % — LOW (ref 13–44)
LYMPHOCYTES # BLD AUTO: 2.43 K/UL — SIGNIFICANT CHANGE UP (ref 1–3.3)
MAGNESIUM SERPL-MCNC: 4.4 MG/DL — HIGH (ref 1.6–2.6)
MAGNESIUM SERPL-MCNC: 6 MG/DL — HIGH (ref 1.6–2.6)
MCHC RBC-ENTMCNC: 26.6 PG — LOW (ref 27–34)
MCHC RBC-ENTMCNC: 32.3 GM/DL — SIGNIFICANT CHANGE UP (ref 32–36)
MCV RBC AUTO: 82.3 FL — SIGNIFICANT CHANGE UP (ref 80–100)
MONOCYTES # BLD AUTO: 1.58 K/UL — HIGH (ref 0–0.9)
MONOCYTES NFR BLD AUTO: 7.3 % — SIGNIFICANT CHANGE UP (ref 2–14)
NEUTROPHILS # BLD AUTO: 17.3 K/UL — HIGH (ref 1.8–7.4)
NEUTROPHILS NFR BLD AUTO: 79.6 % — HIGH (ref 43–77)
NRBC # BLD: 0 /100 WBCS — SIGNIFICANT CHANGE UP (ref 0–0)
NRBC # FLD: 0 K/UL — SIGNIFICANT CHANGE UP (ref 0–0)
PLATELET # BLD AUTO: 293 K/UL — SIGNIFICANT CHANGE UP (ref 150–400)
PROTHROM AB SERPL-ACNC: 10 SEC — SIGNIFICANT CHANGE UP (ref 9.5–13)
RBC # BLD: 3.16 M/UL — LOW (ref 3.8–5.2)
RBC # FLD: 14.9 % — HIGH (ref 10.3–14.5)
WBC # BLD: 21.72 K/UL — HIGH (ref 3.8–10.5)
WBC # FLD AUTO: 21.72 K/UL — HIGH (ref 3.8–10.5)

## 2023-07-26 RX ORDER — ASCORBIC ACID 60 MG
500 TABLET,CHEWABLE ORAL DAILY
Refills: 0 | Status: DISCONTINUED | OUTPATIENT
Start: 2023-07-26 | End: 2023-07-29

## 2023-07-26 RX ORDER — FERROUS SULFATE 325(65) MG
325 TABLET ORAL DAILY
Refills: 0 | Status: DISCONTINUED | OUTPATIENT
Start: 2023-07-26 | End: 2023-07-29

## 2023-07-26 RX ADMIN — Medication 975 MILLIGRAM(S): at 21:56

## 2023-07-26 RX ADMIN — Medication 975 MILLIGRAM(S): at 14:37

## 2023-07-26 RX ADMIN — Medication 975 MILLIGRAM(S): at 05:24

## 2023-07-26 RX ADMIN — SODIUM CHLORIDE 3 MILLILITER(S): 9 INJECTION INTRAMUSCULAR; INTRAVENOUS; SUBCUTANEOUS at 21:26

## 2023-07-26 RX ADMIN — Medication 600 MILLIGRAM(S): at 02:00

## 2023-07-26 RX ADMIN — Medication 975 MILLIGRAM(S): at 15:00

## 2023-07-26 RX ADMIN — SODIUM CHLORIDE 3 MILLILITER(S): 9 INJECTION INTRAMUSCULAR; INTRAVENOUS; SUBCUTANEOUS at 14:00

## 2023-07-26 RX ADMIN — Medication 50 GM/HR: at 07:30

## 2023-07-26 RX ADMIN — Medication 600 MILLIGRAM(S): at 18:11

## 2023-07-26 RX ADMIN — Medication 600 MILLIGRAM(S): at 19:11

## 2023-07-26 RX ADMIN — Medication 600 MILLIGRAM(S): at 11:32

## 2023-07-26 RX ADMIN — Medication 600 MILLIGRAM(S): at 23:36

## 2023-07-26 RX ADMIN — Medication 1 TABLET(S): at 11:33

## 2023-07-26 RX ADMIN — MAGNESIUM HYDROXIDE 30 MILLILITER(S): 400 TABLET, CHEWABLE ORAL at 18:11

## 2023-07-26 RX ADMIN — Medication 30 MILLIGRAM(S): at 05:41

## 2023-07-26 RX ADMIN — Medication 600 MILLIGRAM(S): at 12:00

## 2023-07-26 RX ADMIN — Medication 600 MILLIGRAM(S): at 01:35

## 2023-07-26 RX ADMIN — Medication 975 MILLIGRAM(S): at 21:26

## 2023-07-26 NOTE — PROGRESS NOTE ADULT - ASSESSMENT
27y/o  PPD#1 from  c/b shoulder dystocia in stable condition. Pregnancy complicated by sPEC(on Mg) and GDMA2.    #sPEC/Mg  - D/c Mg@730a  - BPs ON well controlled  - denies severe features   - AM HELLP labs   - On [id number] qd  - continue to monitor     Postpartum state:  - Continue with po analgesia  - Increase ambulation  - Continue regular diet  - IV lock    Sherie Najera  PGY-1

## 2023-07-26 NOTE — PROGRESS NOTE ADULT - SUBJECTIVE AND OBJECTIVE BOX
29yo PPD#2 s/p  seen at bedside for concern of leg swelling. She is worried because this is different from her baseline. Denies CP, SOB, leg pain, difficulty ambulating.     O:  PE:   - Bilateral LE edema, non-pitting, no erythema, no TTP   - RRR    Vitals:   Vital Signs Last 24 Hrs  T(C): 36.6 (2023 18:34), Max: 36.6 (2023 18:34)  T(F): 97.9 (2023 18:34), Max: 97.9 (2023 18:34)  HR: 102 (2023 18:34) (90 - 106)  BP: 107/59 (2023 18:34) (103/65 - 131/68)  BP(mean): --  RR: 16 (2023 18:34) (16 - 20)  SpO2: 99% (2023 18:34) (99% - 100%)    Parameters below as of 2023 18:34  Patient On (Oxygen Delivery Method): room air      MEDICATIONS  (STANDING):  acetaminophen     Tablet .. 975 milliGRAM(s) Oral <User Schedule>  ascorbic acid 500 milliGRAM(s) Oral daily  diphtheria/tetanus/pertussis (acellular) Vaccine (Adacel) 0.5 milliLiter(s) IntraMuscular once  ferrous    sulfate 325 milliGRAM(s) Oral daily  ibuprofen  Tablet. 600 milliGRAM(s) Oral every 6 hours  NIFEdipine XL 30 milliGRAM(s) Oral daily  oxytocin Infusion 41.667 milliUNIT(s)/Min (125 mL/Hr) IV Continuous <Continuous>  oxytocin Infusion 333.333 milliUNIT(s)/Min (1000 mL/Hr) IV Continuous <Continuous>  prenatal multivitamin 1 Tablet(s) Oral daily  sodium chloride 0.9% lock flush 3 milliLiter(s) IV Push every 8 hours    MEDICATIONS  (PRN):  benzocaine 20%/menthol 0.5% Spray 1 Spray(s) Topical every 6 hours PRN for Perineal discomfort  dibucaine 1% Ointment 1 Application(s) Topical every 6 hours PRN Perineal discomfort  diphenhydrAMINE 25 milliGRAM(s) Oral every 6 hours PRN Pruritus  hydrocortisone 1% Cream 1 Application(s) Topical every 6 hours PRN Moderate Pain (4-6)  lanolin Ointment 1 Application(s) Topical every 6 hours PRN nipple soreness  magnesium hydroxide Suspension 30 milliLiter(s) Oral two times a day PRN Constipation  oxyCODONE    IR 5 milliGRAM(s) Oral every 3 hours PRN Moderate to Severe Pain (4-10)  oxyCODONE    IR 5 milliGRAM(s) Oral once PRN Moderate to Severe Pain (4-10)  pramoxine 1%/zinc 5% Cream 1 Application(s) Topical every 4 hours PRN Moderate Pain (4-6)  simethicone 80 milliGRAM(s) Chew every 4 hours PRN Gas  witch hazel Pads 1 Application(s) Topical every 4 hours PRN Perineal discomfort      Labs:  Blood type: A Positive  Rubella IgG: RPR: Negative                          8.4<L>   21.72<H> >-----------< 293    (  @ 06:35 )             26.0<L>                        10.2<L>   29.05<H> >-----------< 319    (  @ 09:50 )             31.4<L>                        11.1<L>   20.72<H> >-----------< 322    (  @ 04:58 )             34.7    23 @ 09:50      137  |  104  |  5<L>  ----------------------------<  167<H>  3.6   |  16<L>  |  0.70    23 @ 04:58      137  |  104  |  5<L>  ----------------------------<  129<H>  3.8   |  17<L>  |  0.66

## 2023-07-26 NOTE — PROGRESS NOTE ADULT - SUBJECTIVE AND OBJECTIVE BOX
27yo  PPD#1  c/b shoulder. Pregnancy complicated by sPEC(on Mg) and GDMA2. Patient seen and examined at bedside, no acute overnight events. No acute complaints, pain well controlled. Patient is ambulating, voiding spontaneously, passing gas, and tolerating regular diet. Denies CP, SOB, N/V, HA, blurred vision, epigastric pain.    Vital Signs Last 24 Hours  T(C): 36.4 (23 @ 02:00), Max: 37.1 (23 @ 06:05)  HR: 100 (23 @ 02:00) (80 - 148)  BP: 121/61 (23 @ 02:00) (95/55 - 182/104)  RR: 20 (23 @ 02:00) (16 - 20)  SpO2: 100% (23 @ 02:00) (88% - 100%)    Physical Exam:  General: NAD  Abdomen: Soft, non-tender, non-distended, fundus firm  Pelvic: Lochia wnl    Labs:    Blood Type: A Positive  Antibody Screen: Negative  RPR: Negative               10.2   29.05 )-----------( 319      (  @ 09:50 )             31.4                11.1   20.72 )-----------( 322      (  @ 04:58 )             34.7                11.2   9.89  )-----------( 332      (  @ 17:14 )             35.0         MEDICATIONS  (STANDING):  acetaminophen     Tablet .. 975 milliGRAM(s) Oral <User Schedule>  diphtheria/tetanus/pertussis (acellular) Vaccine (Adacel) 0.5 milliLiter(s) IntraMuscular once  ibuprofen  Tablet. 600 milliGRAM(s) Oral every 6 hours  lactated ringers. 1000 milliLiter(s) (50 mL/Hr) IV Continuous <Continuous>  magnesium sulfate Infusion 2 Gm/Hr (50 mL/Hr) IV Continuous <Continuous>  NIFEdipine XL 30 milliGRAM(s) Oral daily  oxytocin Infusion 41.667 milliUNIT(s)/Min (125 mL/Hr) IV Continuous <Continuous>  oxytocin Infusion 333.333 milliUNIT(s)/Min (1000 mL/Hr) IV Continuous <Continuous>  prenatal multivitamin 1 Tablet(s) Oral daily  sodium chloride 0.9% lock flush 3 milliLiter(s) IV Push every 8 hours    MEDICATIONS  (PRN):  benzocaine 20%/menthol 0.5% Spray 1 Spray(s) Topical every 6 hours PRN for Perineal discomfort  dibucaine 1% Ointment 1 Application(s) Topical every 6 hours PRN Perineal discomfort  diphenhydrAMINE 25 milliGRAM(s) Oral every 6 hours PRN Pruritus  hydrocortisone 1% Cream 1 Application(s) Topical every 6 hours PRN Moderate Pain (4-6)  lanolin Ointment 1 Application(s) Topical every 6 hours PRN nipple soreness  magnesium hydroxide Suspension 30 milliLiter(s) Oral two times a day PRN Constipation  oxyCODONE    IR 5 milliGRAM(s) Oral every 3 hours PRN Moderate to Severe Pain (4-10)  oxyCODONE    IR 5 milliGRAM(s) Oral once PRN Moderate to Severe Pain (4-10)  pramoxine 1%/zinc 5% Cream 1 Application(s) Topical every 4 hours PRN Moderate Pain (4-6)  simethicone 80 milliGRAM(s) Chew every 4 hours PRN Gas  witch hazel Pads 1 Application(s) Topical every 4 hours PRN Perineal discomfort   29yo  PPD#1  c/b shoulder. Pregnancy complicated by sPEC(on Mg) and GDMA2. Patient seen and examined at bedside, no acute overnight events. No acute complaints, pain well managed. Patient is ambulating, voiding spontaneously, passing gas, and tolerating regular diet. Denies CP, SOB, N/V, HA, blurred vision, epigastric pain.    Vital Signs Last 24 Hours  T(C): 36.4 (23 @ 02:00), Max: 37.1 (23 @ 06:05)  HR: 100 (23 @ 02:00) (80 - 148)  BP: 121/61 (23 @ 02:00) (95/55 - 182/104)  RR: 20 (23 @ 02:00) (16 - 20)  SpO2: 100% (23 @ 02:00) (88% - 100%)    Physical Exam:  General: NAD  Abdomen: Soft, non-tender, non-distended, fundus firm  Pelvic: Lochia wnl    Labs:    Blood Type: A Positive  Antibody Screen: Negative  RPR: Negative               10.2   29.05 )-----------( 319      (  @ 09:50 )             31.4                11.1   20.72 )-----------( 322      (  @ 04:58 )             34.7                11.2   9.89  )-----------( 332      (  @ 17:14 )             35.0         MEDICATIONS  (STANDING):  acetaminophen     Tablet .. 975 milliGRAM(s) Oral <User Schedule>  diphtheria/tetanus/pertussis (acellular) Vaccine (Adacel) 0.5 milliLiter(s) IntraMuscular once  ibuprofen  Tablet. 600 milliGRAM(s) Oral every 6 hours  lactated ringers. 1000 milliLiter(s) (50 mL/Hr) IV Continuous <Continuous>  magnesium sulfate Infusion 2 Gm/Hr (50 mL/Hr) IV Continuous <Continuous>  NIFEdipine XL 30 milliGRAM(s) Oral daily  oxytocin Infusion 41.667 milliUNIT(s)/Min (125 mL/Hr) IV Continuous <Continuous>  oxytocin Infusion 333.333 milliUNIT(s)/Min (1000 mL/Hr) IV Continuous <Continuous>  prenatal multivitamin 1 Tablet(s) Oral daily  sodium chloride 0.9% lock flush 3 milliLiter(s) IV Push every 8 hours    MEDICATIONS  (PRN):  benzocaine 20%/menthol 0.5% Spray 1 Spray(s) Topical every 6 hours PRN for Perineal discomfort  dibucaine 1% Ointment 1 Application(s) Topical every 6 hours PRN Perineal discomfort  diphenhydrAMINE 25 milliGRAM(s) Oral every 6 hours PRN Pruritus  hydrocortisone 1% Cream 1 Application(s) Topical every 6 hours PRN Moderate Pain (4-6)  lanolin Ointment 1 Application(s) Topical every 6 hours PRN nipple soreness  magnesium hydroxide Suspension 30 milliLiter(s) Oral two times a day PRN Constipation  oxyCODONE    IR 5 milliGRAM(s) Oral every 3 hours PRN Moderate to Severe Pain (4-10)  oxyCODONE    IR 5 milliGRAM(s) Oral once PRN Moderate to Severe Pain (4-10)  pramoxine 1%/zinc 5% Cream 1 Application(s) Topical every 4 hours PRN Moderate Pain (4-6)  simethicone 80 milliGRAM(s) Chew every 4 hours PRN Gas  witch hazel Pads 1 Application(s) Topical every 4 hours PRN Perineal discomfort

## 2023-07-26 NOTE — PROGRESS NOTE ADULT - ASSESSMENT
27yo PPD#2 s/p .  Patient is stable and doing well post-partum.    - No concern for DVT given clinical hx and pe  - Increase ambulation, SCDs when not ambulating  - Cont to monitor    D/w attending physician Dr. Génesis Najera   PGY-1

## 2023-07-27 PROCEDURE — 99254 IP/OBS CNSLTJ NEW/EST MOD 60: CPT

## 2023-07-27 RX ORDER — FERROUS SULFATE 325(65) MG
1 TABLET ORAL
Qty: 0 | Refills: 0 | DISCHARGE
Start: 2023-07-27

## 2023-07-27 RX ORDER — ASCORBIC ACID 60 MG
1 TABLET,CHEWABLE ORAL
Qty: 0 | Refills: 0 | DISCHARGE
Start: 2023-07-27

## 2023-07-27 RX ORDER — INSULIN LISPRO 100/ML
8 VIAL (ML) SUBCUTANEOUS
Refills: 0 | DISCHARGE

## 2023-07-27 RX ORDER — INSULIN LISPRO 100/ML
10 VIAL (ML) SUBCUTANEOUS
Refills: 0 | DISCHARGE

## 2023-07-27 RX ORDER — ACETAMINOPHEN 500 MG
3 TABLET ORAL
Qty: 0 | Refills: 0 | DISCHARGE
Start: 2023-07-27

## 2023-07-27 RX ORDER — IBUPROFEN 200 MG
1 TABLET ORAL
Qty: 0 | Refills: 0 | DISCHARGE
Start: 2023-07-27

## 2023-07-27 RX ORDER — ASPIRIN/CALCIUM CARB/MAGNESIUM 324 MG
1 TABLET ORAL
Refills: 0 | DISCHARGE

## 2023-07-27 RX ORDER — SENNA PLUS 8.6 MG/1
2 TABLET ORAL AT BEDTIME
Refills: 0 | Status: DISCONTINUED | OUTPATIENT
Start: 2023-07-27 | End: 2023-07-29

## 2023-07-27 RX ORDER — INSULIN DETEMIR 100/ML (3)
48 INSULIN PEN (ML) SUBCUTANEOUS
Refills: 0 | DISCHARGE

## 2023-07-27 RX ORDER — INSULIN LISPRO 100/ML
12 VIAL (ML) SUBCUTANEOUS
Refills: 0 | DISCHARGE

## 2023-07-27 RX ADMIN — Medication 600 MILLIGRAM(S): at 18:30

## 2023-07-27 RX ADMIN — Medication 975 MILLIGRAM(S): at 16:04

## 2023-07-27 RX ADMIN — Medication 30 MILLIGRAM(S): at 05:07

## 2023-07-27 RX ADMIN — SENNA PLUS 2 TABLET(S): 8.6 TABLET ORAL at 21:44

## 2023-07-27 RX ADMIN — Medication 600 MILLIGRAM(S): at 12:40

## 2023-07-27 RX ADMIN — Medication 500 MILLIGRAM(S): at 12:10

## 2023-07-27 RX ADMIN — MAGNESIUM HYDROXIDE 30 MILLILITER(S): 400 TABLET, CHEWABLE ORAL at 12:12

## 2023-07-27 RX ADMIN — Medication 975 MILLIGRAM(S): at 03:33

## 2023-07-27 RX ADMIN — Medication 975 MILLIGRAM(S): at 21:16

## 2023-07-27 RX ADMIN — Medication 975 MILLIGRAM(S): at 20:46

## 2023-07-27 RX ADMIN — Medication 600 MILLIGRAM(S): at 05:36

## 2023-07-27 RX ADMIN — SODIUM CHLORIDE 3 MILLILITER(S): 9 INJECTION INTRAMUSCULAR; INTRAVENOUS; SUBCUTANEOUS at 13:02

## 2023-07-27 RX ADMIN — Medication 975 MILLIGRAM(S): at 04:03

## 2023-07-27 RX ADMIN — Medication 325 MILLIGRAM(S): at 12:10

## 2023-07-27 RX ADMIN — Medication 1 TABLET(S): at 12:10

## 2023-07-27 RX ADMIN — Medication 975 MILLIGRAM(S): at 09:52

## 2023-07-27 RX ADMIN — Medication 600 MILLIGRAM(S): at 05:06

## 2023-07-27 RX ADMIN — Medication 600 MILLIGRAM(S): at 00:06

## 2023-07-27 RX ADMIN — Medication 600 MILLIGRAM(S): at 17:46

## 2023-07-27 RX ADMIN — SODIUM CHLORIDE 3 MILLILITER(S): 9 INJECTION INTRAMUSCULAR; INTRAVENOUS; SUBCUTANEOUS at 05:06

## 2023-07-27 RX ADMIN — Medication 975 MILLIGRAM(S): at 10:30

## 2023-07-27 RX ADMIN — Medication 600 MILLIGRAM(S): at 12:10

## 2023-07-27 RX ADMIN — Medication 975 MILLIGRAM(S): at 15:33

## 2023-07-27 NOTE — CHART NOTE - NSCHARTNOTEFT_GEN_A_CORE
Patient is a 27 y/o  PPD#2 from  c/b shoulder, sPEC and A2. Patient c/o B/L lower extremity numbness that started today. 9/10 numbness on RLE, 5/10 numbness on LLE. Denies tingling, burning or pain. Patient also c/o swelling on B/L LE.     PE:  Patient can feel sharp sensation on LLE, but not on RLE   +1 edema on B/L LE Patient is a 27 y/o  PPD#2 from  c/b shoulder, sPEC and A2. Patient c/o B/L lower extremity numbness that started yesterday afternoon. 9/10 numbness on RLE, 5/10 numbness on LLE. Denies tingling, burning or pain. Patient also c/o swelling on B/L LE. Patient is able to ambulate.     PE:  Patient can feel sharp sensation on LLE, but not on RLE   +1 edema on B/L LE Patient is a 29 y/o  PPD#2 from  c/b shoulder, sPEC and A2. Patient received epidural for .    Patient c/o B/L lower extremity numbness that started yesterday afternoon. 9/10 numbness on RLE, 5/10 numbness on LLE. Denies tingling, burning or pain. Patient also c/o swelling on B/L LE. Patient is able to ambulate.     PE:  Patient can feel sharp sensation on LLE, but not on RLE   +1 edema on B/L LE

## 2023-07-27 NOTE — CONSULT NOTE ADULT - ATTENDING COMMENTS
Per OB team - she had epidural anesthesia.   Per patient - sensory symptoms are improving. No issues with bowel control but has some difficulty holding urine at times.   No weakness.  No gait difficulty.     Exam:  Motor: 5/5 throughout including biceps and ankle DF, B.  Reflexes 2 throughout.     A/P  Ms. Jeter is a 29 yo woman with prolonged persistent sensory symptoms in the legs after  with epidural anesthesia.  The differential diagnosis includes:  - check with anesthesia if this could be prolonged effect of epidural anesthesia - se has been improving slowly  - stretch injury to multiple nerves in the lumbosacral plexus, B related to labor  - MRI LS spine to exclude a structural lesion - can also get MRI imaging higher up depending on level of administration of epidural anesthesia - we recommend discussing with anesthesia.   D/W OB team, patient and .   Thank you

## 2023-07-27 NOTE — CONSULT NOTE ADULT - SUBJECTIVE AND OBJECTIVE BOX
Neurology - Consult Note - 07-27-23  -  Tree Zhang MD  PGY-4 Neurology  Spectra: [phone] ([facility]), [phone] ([facility])  -    Name: RENETTA BLACKBURN; 28y (1995)    Reason for Admission: Other pregnancy-related conditions, antepartum    HPI:     28-year-old right-handed postpartum female s/p normal spontaneous delivery on 7/25/23. Neurology consulted due to bilateral lower extremity numbness. Per patient, she had epidural started around 00:30 AM on 7/24/23. Patient states that she delivered around 07:30 AM 7/25/23. Patient endorses that she had sensory loss in bilateral lower extremities from time of epidural onwards. Patient has some difficulty explaining the timeline of her symptoms, so it is not entirely clear. However, patient reports that she first noticed that her RLE was numb from the waist to the knee sometime on 7/25. On 7/26, patient then noticed that she was numb in her LLE from waist to her toe. Both RLE and LLE sensory loss was circumferential (i.e., lateral, posterior, medial, and anterior). During this initial encounter 7/27, patient complains of "10/10" numbness on lateral R thigh, "5/10" numbness R medial thigh, and "5/10" numbness circumferentially from knee to toes of RLE; and entire LLE is "10/10" numbness. She denies changes in speech, vision, hearing, swallowing, voice quality, focal weakness, or balance/room-spinning sensations. Patient endorses that she believes her symptoms are improving at this time.        Review of Systems:    All other review of systems is negative unless indicated above.    Allergies:  No Known Allergies      PMHx:   Broken foot      PFHx:     PSuHx:   No significant past surgical history        Medications:  MEDICATIONS  (STANDING):  acetaminophen     Tablet .. 975 milliGRAM(s) Oral <User Schedule>  ascorbic acid 500 milliGRAM(s) Oral daily  diphtheria/tetanus/pertussis (acellular) Vaccine (Adacel) 0.5 milliLiter(s) IntraMuscular once  ferrous    sulfate 325 milliGRAM(s) Oral daily  ibuprofen  Tablet. 600 milliGRAM(s) Oral every 6 hours  NIFEdipine XL 30 milliGRAM(s) Oral daily  oxytocin Infusion 333.333 milliUNIT(s)/Min (1000 mL/Hr) IV Continuous <Continuous>  prenatal multivitamin 1 Tablet(s) Oral daily  senna 2 Tablet(s) Oral at bedtime  sodium chloride 0.9% lock flush 3 milliLiter(s) IV Push every 8 hours    MEDICATIONS  (PRN):  benzocaine 20%/menthol 0.5% Spray 1 Spray(s) Topical every 6 hours PRN for Perineal discomfort  dibucaine 1% Ointment 1 Application(s) Topical every 6 hours PRN Perineal discomfort  diphenhydrAMINE 25 milliGRAM(s) Oral every 6 hours PRN Pruritus  hydrocortisone 1% Cream 1 Application(s) Topical every 6 hours PRN Moderate Pain (4-6)  lanolin Ointment 1 Application(s) Topical every 6 hours PRN nipple soreness  magnesium hydroxide Suspension 30 milliLiter(s) Oral two times a day PRN Constipation  oxyCODONE    IR 5 milliGRAM(s) Oral every 3 hours PRN Moderate to Severe Pain (4-10)  oxyCODONE    IR 5 milliGRAM(s) Oral once PRN Moderate to Severe Pain (4-10)  pramoxine 1%/zinc 5% Cream 1 Application(s) Topical every 4 hours PRN Moderate Pain (4-6)  simethicone 80 milliGRAM(s) Chew every 4 hours PRN Gas  witch hazel Pads 1 Application(s) Topical every 4 hours PRN Perineal discomfort      Vitals:  T(C): 36.8 (07-27-23 @ 13:36), Max: 36.9 (07-27-23 @ 05:07)  HR: 107 (07-27-23 @ 13:36) (94 - 107)  BP: 118/63 (07-27-23 @ 13:36) (107/59 - 128/71)  RR: 18 (07-27-23 @ 13:36) (16 - 18)  SpO2: 99% (07-27-23 @ 13:36) (99% - 100%)    Physical Examination: INCOMPLETE  ***    Labs:                        8.4    21.72 )-----------( 293      ( 26 Jul 2023 06:35 )             26.0       Mg     6.00     07-26      CAPILLARY BLOOD GLUCOSE            PT/INR - ( 26 Jul 2023 06:35 )   PT: 10.0 sec;   INR: <0.90 ratio         PTT - ( 26 Jul 2023 06:35 )  PTT:25.4 sec  CSF:                  Radiology:     Neurology - Consult Note - 07-27-23  -  Tree Zhang MD  PGY-4 Neurology  Spectra: [phone] ([facility]), [phone] ([facility])  -    Name: RENETTA BLACKBURN; 28y (1995)    Reason for Admission: Other pregnancy-related conditions, antepartum    HPI:     28-year-old right-handed postpartum female s/p normal spontaneous delivery on 7/25/23. Neurology consulted due to bilateral lower extremity numbness. Per patient, she had epidural started around 00:30 AM on 7/24/23. Patient states that she delivered around 07:30 AM 7/25/23. Patient endorses that she had sensory loss in bilateral lower extremities from time of epidural onwards. Patient has some difficulty explaining the timeline of her symptoms, so it is not entirely clear. However, patient reports that she first noticed that her RLE was numb from the waist to the knee sometime on 7/25. On 7/26, patient then noticed that she was numb in her LLE from waist to her toe. Both RLE and LLE sensory loss was circumferential (i.e., lateral, posterior, medial, and anterior). During this initial encounter 7/27, patient complains of "10/10" numbness on lateral R thigh, "5/10" numbness R medial thigh, and "5/10" numbness circumferentially from knee to toes of RLE; and entire LLE is "10/10" numbness. She denies changes in speech, vision, hearing, swallowing, voice quality, focal weakness, or balance/room-spinning sensations. Patient endorses that she believes her symptoms are improving at this time.      Review of Systems:    All other review of systems is negative unless indicated above.    Allergies:  No Known Allergies      PMHx:   Broken foot      PFHx:     PSuHx:   No significant past surgical history        Medications:  MEDICATIONS  (STANDING):  acetaminophen     Tablet .. 975 milliGRAM(s) Oral <User Schedule>  ascorbic acid 500 milliGRAM(s) Oral daily  diphtheria/tetanus/pertussis (acellular) Vaccine (Adacel) 0.5 milliLiter(s) IntraMuscular once  ferrous    sulfate 325 milliGRAM(s) Oral daily  ibuprofen  Tablet. 600 milliGRAM(s) Oral every 6 hours  NIFEdipine XL 30 milliGRAM(s) Oral daily  oxytocin Infusion 333.333 milliUNIT(s)/Min (1000 mL/Hr) IV Continuous <Continuous>  prenatal multivitamin 1 Tablet(s) Oral daily  senna 2 Tablet(s) Oral at bedtime  sodium chloride 0.9% lock flush 3 milliLiter(s) IV Push every 8 hours    MEDICATIONS  (PRN):  benzocaine 20%/menthol 0.5% Spray 1 Spray(s) Topical every 6 hours PRN for Perineal discomfort  dibucaine 1% Ointment 1 Application(s) Topical every 6 hours PRN Perineal discomfort  diphenhydrAMINE 25 milliGRAM(s) Oral every 6 hours PRN Pruritus  hydrocortisone 1% Cream 1 Application(s) Topical every 6 hours PRN Moderate Pain (4-6)  lanolin Ointment 1 Application(s) Topical every 6 hours PRN nipple soreness  magnesium hydroxide Suspension 30 milliLiter(s) Oral two times a day PRN Constipation  oxyCODONE    IR 5 milliGRAM(s) Oral every 3 hours PRN Moderate to Severe Pain (4-10)  oxyCODONE    IR 5 milliGRAM(s) Oral once PRN Moderate to Severe Pain (4-10)  pramoxine 1%/zinc 5% Cream 1 Application(s) Topical every 4 hours PRN Moderate Pain (4-6)  simethicone 80 milliGRAM(s) Chew every 4 hours PRN Gas  witch hazel Pads 1 Application(s) Topical every 4 hours PRN Perineal discomfort      Vitals:  T(C): 36.8 (07-27-23 @ 13:36), Max: 36.9 (07-27-23 @ 05:07)  HR: 107 (07-27-23 @ 13:36) (94 - 107)  BP: 118/63 (07-27-23 @ 13:36) (107/59 - 128/71)  RR: 18 (07-27-23 @ 13:36) (16 - 18)  SpO2: 99% (07-27-23 @ 13:36) (99% - 100%)      Neurological Examination:    - Mental Status: Eyes open, attends to examiner; oriented to person, age, month, year, location, and situation; speech is fluent with intact naming, intact repetition, and follows 1-step and 3-step mid-line crossing commands; good overall fund of knowledge (aware of current events, relevant past history, and vocabulary appropriate for level of education); immediate recall is 3/3 words and delayed recall is 3/3 words at 5 minutes; able to spell WORLD backwards and recite the days of the week in reverse.    - Cranial Nerves:  II: Visual fields are full to confrontation; pupils are equal, round, and reactive to light   III, IV, VI: Extraocular movements are intact without nystagmus  V: Facial sensation is intact in the V1-V3 distribution bilaterally  VII: Face is symmetric with normal eye closure and smile  VIII: Hearing is intact to conversation  IX, X: Uvula is midline and soft palate rises symmetrically  XI: Shoulder shrug intact  XII: Tongue protrudes in the midline    - Motor/Strength Testing:  - No drifts x 4 extremities.                                   Right           Left  Deltoid                     5                 5  Biceps                      4+                4+  Triceps                     5                 5  Hand                  5                 5    Hip Flex                   5                  5  Knee Ext	      5                  5  Dorsiflex                 4+                4+  Plantarflex               5                  5    - There is no pronator drift.   - Normal muscle bulk and tone throughout.    - Reflexes:   Bicep (C5/C6):                  R 2+ --- L 2+   Brachioradialis (C5/C6) :   R 2+ --- L 2+   Patella (L3/L4) :                 R 2+ --- L 2+   Ankle (S1) :                       R 2+ --- L 2+     - Plant responses down bilaterally.      - Sensory:   -- Pinprick:   --- RLE above the knee: "strange" hypersensitivity to pinprick in R thigh, less hypersensitive in lateral R thigh  --- RLE below the knee: sensation intact to pinprick, grossly equal to LLE  --- LLE above and below the knee: intact to pinprick  --- b/l UE: intact to pinprick    -- Crude touch:  --- RLE above the knee: "strange" sensation, otherwise intact  --- RLE below the knee: maybe a bit dull  --- LLE above and below the knee: grossly intact  --- Abdomen: intact throughout    -- Proprioception:  --- intact in most distal bilateral lower extremities      - Coordination: Finger-nose-finger intact without ataxia or dysmetria.    - Gait: Normal steps, base, arm swing, and turning.       Labs:                        8.4    21.72 )-----------( 293      ( 26 Jul 2023 06:35 )             26.0       Mg     6.00     07-26      CAPILLARY BLOOD GLUCOSE            PT/INR - ( 26 Jul 2023 06:35 )   PT: 10.0 sec;   INR: <0.90 ratio         PTT - ( 26 Jul 2023 06:35 )  PTT:25.4 sec  CSF:                  Radiology:     Neurology - Consult Note - 07-27-23  -  Tree Zhang MD  PGY-4 Neurology  Spectra: [phone] ([facility]), [phone] ([facility])  -    Name: RENETTA BLACKBURN; 28y (1995)    Reason for Admission: Other pregnancy-related conditions, antepartum    HPI:     28-year-old right-handed postpartum female s/p normal spontaneous delivery on 7/25/23. Neurology consulted due to bilateral lower extremity numbness. Per patient, she had epidural started around 00:30 AM on 7/24/23. Patient states that she delivered around 07:30 AM 7/25/23. Patient endorses that she had sensory loss in bilateral lower extremities from time of epidural onwards. Patient has some difficulty explaining the timeline of her symptoms, so it is not entirely clear. However, patient reports that she first noticed that her RLE was numb from the waist to the knee sometime on 7/25. On 7/26, patient then noticed that she was numb in her LLE from waist to her toe. Both RLE and LLE sensory loss was circumferential (i.e., lateral, posterior, medial, and anterior). During this initial encounter 7/27, patient complains of "10/10" numbness on lateral R thigh, "5/10" numbness R medial thigh, and "5/10" numbness circumferentially from knee to toes of RLE; and entire LLE is "10/10" numbness. She denies changes in speech, vision, hearing, swallowing, voice quality, focal weakness, or balance/room-spinning sensations. Patient endorses that she believes her symptoms are improving at this time.      Review of Systems:    All other review of systems is negative unless indicated above.    Allergies:  No Known Allergies      PMHx:   Broken foot      PFHx:     PSuHx:   No significant past surgical history        Medications:  MEDICATIONS  (STANDING):  acetaminophen     Tablet .. 975 milliGRAM(s) Oral <User Schedule>  ascorbic acid 500 milliGRAM(s) Oral daily  diphtheria/tetanus/pertussis (acellular) Vaccine (Adacel) 0.5 milliLiter(s) IntraMuscular once  ferrous    sulfate 325 milliGRAM(s) Oral daily  ibuprofen  Tablet. 600 milliGRAM(s) Oral every 6 hours  NIFEdipine XL 30 milliGRAM(s) Oral daily  oxytocin Infusion 333.333 milliUNIT(s)/Min (1000 mL/Hr) IV Continuous <Continuous>  prenatal multivitamin 1 Tablet(s) Oral daily  senna 2 Tablet(s) Oral at bedtime  sodium chloride 0.9% lock flush 3 milliLiter(s) IV Push every 8 hours    MEDICATIONS  (PRN):  benzocaine 20%/menthol 0.5% Spray 1 Spray(s) Topical every 6 hours PRN for Perineal discomfort  dibucaine 1% Ointment 1 Application(s) Topical every 6 hours PRN Perineal discomfort  diphenhydrAMINE 25 milliGRAM(s) Oral every 6 hours PRN Pruritus  hydrocortisone 1% Cream 1 Application(s) Topical every 6 hours PRN Moderate Pain (4-6)  lanolin Ointment 1 Application(s) Topical every 6 hours PRN nipple soreness  magnesium hydroxide Suspension 30 milliLiter(s) Oral two times a day PRN Constipation  oxyCODONE    IR 5 milliGRAM(s) Oral every 3 hours PRN Moderate to Severe Pain (4-10)  oxyCODONE    IR 5 milliGRAM(s) Oral once PRN Moderate to Severe Pain (4-10)  pramoxine 1%/zinc 5% Cream 1 Application(s) Topical every 4 hours PRN Moderate Pain (4-6)  simethicone 80 milliGRAM(s) Chew every 4 hours PRN Gas  witch hazel Pads 1 Application(s) Topical every 4 hours PRN Perineal discomfort      Vitals:  T(C): 36.8 (07-27-23 @ 13:36), Max: 36.9 (07-27-23 @ 05:07)  HR: 107 (07-27-23 @ 13:36) (94 - 107)  BP: 118/63 (07-27-23 @ 13:36) (107/59 - 128/71)  RR: 18 (07-27-23 @ 13:36) (16 - 18)  SpO2: 99% (07-27-23 @ 13:36) (99% - 100%)      Neurological Examination:    - Mental Status: Eyes open, attends to examiner; oriented to person, age, month, year, location, and situation; speech is fluent with intact naming, intact repetition, and follows 1-step and 3-step mid-line crossing commands; good overall fund of knowledge (aware of current events, relevant past history, and vocabulary appropriate for level of education); immediate recall is 3/3 words and delayed recall is 3/3 words at 5 minutes; able to spell WORLD backwards and recite the days of the week in reverse.    - Cranial Nerves:  II: Visual fields are full to confrontation; pupils are equal, round, and reactive to light   III, IV, VI: Extraocular movements are intact without nystagmus  V: Facial sensation is intact in the V1-V3 distribution bilaterally  VII: Face is symmetric with normal eye closure and smile  VIII: Hearing is intact to conversation  IX, X: Uvula is midline and soft palate rises symmetrically  XI: Shoulder shrug intact  XII: Tongue protrudes in the midline    - Motor/Strength Testing:  - No drifts x 4 extremities.                                   Right           Left  Deltoid                     5                 5  Biceps                      4+                4+  Triceps                     5                 5  Hand                  5                 5    Hip Flex                   5                  5  Knee Ext	      5                  5  Dorsiflex                 4+                4+  Plantarflex               5                  5    - There is no pronator drift.   - Normal muscle bulk and tone throughout.    - Reflexes:   Bicep (C5/C6):                  R 2+ --- L 2+   Brachioradialis (C5/C6) :   R 2+ --- L 2+   Patella (L3/L4) :                 R 2+ --- L 2+   Ankle (S1) :                       R 2+ --- L 2+     - Plant responses down bilaterally.    - Sensory:   -- Pinprick:   --- RLE above the knee: "strange" hypersensitivity to pinprick in R thigh, less hypersensitive in lateral R thigh  --- RLE below the knee: sensation intact to pinprick, grossly equal to LLE  --- LLE above and below the knee: intact to pinprick  --- b/l UE: intact to pinprick    -- Crude touch:  --- RLE above the knee: "strange" sensation, otherwise intact  --- RLE below the knee: maybe a bit dull  --- LLE above and below the knee: grossly intact  --- Abdomen: intact throughout    -- Proprioception:  --- intact in most distal bilateral lower extremities    - Coordination: Finger-nose-finger intact without ataxia or dysmetria.    - Gait: Normal steps, base, arm swing, and turning.       Labs:                        8.4    21.72 )-----------( 293      ( 26 Jul 2023 06:35 )             26.0       Mg     6.00     07-26      CAPILLARY BLOOD GLUCOSE    PT/INR - ( 26 Jul 2023 06:35 )   PT: 10.0 sec;   INR: <0.90 ratio     PTT - ( 26 Jul 2023 06:35 )  PTT:25.4 sec    Radiology:

## 2023-07-27 NOTE — CONSULT NOTE ADULT - ASSESSMENT
Assessment: 28-year-old right-handed postpartum female s/p normal spontaneous delivery on 7/25/23. Neurology consulted due to bilateral lower extremity numbness. Per patient, she had epidural started around 00:30 AM on 7/24/23. Patient states that she delivered around 07:30 AM 7/25/23. Patient endorses that she had sensory loss in bilateral lower extremities from time of epidural onwards. Patient has some difficulty explaining the timeline of her symptoms, so it is not entirely clear. However, patient reports that she first noticed that her RLE was numb from the waist to the knee sometime on 7/25. On 7/26, patient then noticed that she was numb in her LLE from waist to her toe. Both RLE and LLE sensory loss was circumferential (i.e., lateral, posterior, medial, and anterior). During this initial encounter 7/27, patient complains of "10/10" numbness on lateral R thigh, "5/10" numbness R medial thigh, and "5/10" numbness circumferentially from knee to toes of RLE; and entire LLE is "10/10" numbness. She denies significant back pain, changes in speech, vision, hearing, swallowing, voice quality, focal weakness,  balance/room-spinning sensations, or bowel complaints. Patient endorses some urinary incontinence, which is believed to be most likely due to vaginal delivery. Regarding sensation, she endorses that her symptoms are improving at this time.    Impression: Bilateral lower extremity hypoesthesia w/o proprioceptive loss and w/o paresis. Symptoms possibly localizable to spinal cord, however, seemingly sparing multiple tracts vs bilateral dorsal roots. Difficult to localize given patient's distribution of sensory loss and sparing of other sensory modalities and motor. Etiology unclear at this time.     Recommendations:   [] MRI lumbar spine w/o contrast  [] Would touch base with Anesthesiology to inquire whether they would also want to evaluate thoracic or cervical     Assessment: 28-year-old right-handed postpartum female s/p normal spontaneous delivery on 7/25/23. Neurology consulted due to bilateral lower extremity numbness. Per patient, she had epidural started around 00:30 AM on 7/24/23. Patient states that she delivered around 07:30 AM 7/25/23. Patient endorses that she had sensory loss in bilateral lower extremities from time of epidural onwards. Patient has some difficulty explaining the timeline of her symptoms, so it is not entirely clear. However, patient reports that she first noticed that her RLE was numb from the waist to the knee sometime on 7/25. On 7/26, patient then noticed that she was numb in her LLE from waist to her toe. Both RLE and LLE sensory loss was circumferential (i.e., lateral, posterior, medial, and anterior). During this initial encounter 7/27, patient complains of "10/10" numbness on lateral R thigh, "5/10" numbness R medial thigh, and "5/10" numbness circumferentially from knee to toes of RLE; and entire LLE is "10/10" numbness. She denies significant back pain, changes in speech, vision, hearing, swallowing, voice quality, focal weakness,  balance/room-spinning sensations, or bowel complaints. Patient endorses some urinary incontinence, which is believed to be most likely due to vaginal delivery. Regarding sensation, she endorses that her symptoms are improving at this time.    Impression: Bilateral lower extremity hypoesthesia w/o proprioceptive loss and w/o paresis. Symptoms possibly localizable to multiple lumbar and sacral roots,, however, seemingly sparing multiple tracts vs bilateral dorsal roots. Difficult to localize given patient's distribution of sensory loss and sparing of other sensory modalities and motor. Etiology unclear at this time.     Recommendations:   [] MRI lumbar spine w/o contrast  [] Would touch base with Anesthesiology to inquire whether they would also want to evaluate thoracic or cervical

## 2023-07-27 NOTE — PROGRESS NOTE ADULT - SUBJECTIVE AND OBJECTIVE BOX
OB Attending Progress Note:  PPD#1    S: 27yo PPD#1 s/p . Patient feels well. Pain is well controlled. She is tolerating a regular diet and passing flatus. She is voiding spontaneously. and ambulating without difficulty.  Reporting b/l LE numbness and tingling. She is breastfeeding. Denies CP/SOB. Denies lightheadedness/dizziness. Denies N/V/D.    O:  Vitals:  Vital Signs Last 24 Hrs  T(C): 36.7 (2023 10:31), Max: 36.9 (2023 05:07)  T(F): 98.1 (2023 10:31), Max: 98.4 (2023 05:07)  HR: 106 (2023 10:31) (94 - 106)  BP: 120/61 (2023 10:31) (105/59 - 128/71)  BP(mean): --  RR: 17 (2023 10:31) (16 - 18)  SpO2: 100% (2023 10:31) (99% - 100%)    Parameters below as of 2023 09:20  Patient On (Oxygen Delivery Method): room air        MEDICATIONS  (STANDING):  acetaminophen     Tablet .. 975 milliGRAM(s) Oral <User Schedule>  ascorbic acid 500 milliGRAM(s) Oral daily  diphtheria/tetanus/pertussis (acellular) Vaccine (Adacel) 0.5 milliLiter(s) IntraMuscular once  ferrous    sulfate 325 milliGRAM(s) Oral daily  ibuprofen  Tablet. 600 milliGRAM(s) Oral every 6 hours  NIFEdipine XL 30 milliGRAM(s) Oral daily  oxytocin Infusion 41.667 milliUNIT(s)/Min (125 mL/Hr) IV Continuous <Continuous>  oxytocin Infusion 333.333 milliUNIT(s)/Min (1000 mL/Hr) IV Continuous <Continuous>  prenatal multivitamin 1 Tablet(s) Oral daily  sodium chloride 0.9% lock flush 3 milliLiter(s) IV Push every 8 hours      Labs:  Blood type: A Positive  Rubella IgG: RPR: Negative                          8.4<L>   21.72<H> >-----------< 293    (  @ 06:35 )             26.0<L>                        10.2<L>   29.05<H> >-----------< 319    (  @ 09:50 )             31.4<L>                        11.1<L>   20.72<H> >-----------< 322    (  @ 04:58 )             34.7    23 @ 09:50      137  |  104  |  5<L>  ----------------------------<  167<H>  3.6   |  16<L>  |  0.70    23 @ 04:58      137  |  104  |  5<L>  ----------------------------<  129<H>  3.8   |  17<L>  |  0.66        Ca    8.6      2023 09:50  Ca    8.8      2023 04:58  Mg     6.00<H>       Mg     4.40<H>       Mg     4.20<H>       Mg     4.20<H>         TPro  5.2<L>  /  Alb  2.8<L>  /  TBili  0.3  /  DBili  x   /  AST  26  /  ALT  12  /  AlkPhos  173<H>  23 @ 09:50  TPro  5.9<L>  /  Alb  2.9<L>  /  TBili  0.3  /  DBili  x   /  AST  19  /  ALT  10  /  AlkPhos  177<H>  23 @ 04:58          Physical Exam:  General: NAD  CV: RR  Pulm: Breathing comfortably on RA  Abdomen: soft, non-tender, non-distended, +BS, fundus firm  Vaginal: Lochia wnl  Extremities: No erythema/edema  CN II-XII grossly intact    A/P: 27yo PPD#1 s/p  c/b sPEC s/p Mg.  Pt now reporting b/l LE numbness   - Pain well controlled, continue current pain regimen  - Will consult neurology  - Increase ambulation, SCDs when not ambulating  - Continue regular diet  - cont antihypertensives      Chandrika Minor MD

## 2023-07-28 PROCEDURE — 72148 MRI LUMBAR SPINE W/O DYE: CPT | Mod: 26

## 2023-07-28 RX ORDER — NIFEDIPINE 30 MG
1 TABLET, EXTENDED RELEASE 24 HR ORAL
Qty: 42 | Refills: 0
Start: 2023-07-28 | End: 2023-09-07

## 2023-07-28 RX ADMIN — Medication 975 MILLIGRAM(S): at 03:15

## 2023-07-28 RX ADMIN — Medication 600 MILLIGRAM(S): at 00:29

## 2023-07-28 RX ADMIN — Medication 600 MILLIGRAM(S): at 00:59

## 2023-07-28 RX ADMIN — Medication 30 MILLIGRAM(S): at 05:54

## 2023-07-28 RX ADMIN — Medication 975 MILLIGRAM(S): at 03:45

## 2023-07-28 RX ADMIN — Medication 975 MILLIGRAM(S): at 21:54

## 2023-07-28 RX ADMIN — Medication 325 MILLIGRAM(S): at 12:23

## 2023-07-28 RX ADMIN — Medication 600 MILLIGRAM(S): at 13:20

## 2023-07-28 RX ADMIN — Medication 600 MILLIGRAM(S): at 05:54

## 2023-07-28 RX ADMIN — Medication 600 MILLIGRAM(S): at 12:23

## 2023-07-28 RX ADMIN — Medication 1 TABLET(S): at 12:23

## 2023-07-28 RX ADMIN — Medication 975 MILLIGRAM(S): at 22:52

## 2023-07-28 RX ADMIN — Medication 600 MILLIGRAM(S): at 17:31

## 2023-07-28 RX ADMIN — Medication 975 MILLIGRAM(S): at 10:00

## 2023-07-28 RX ADMIN — Medication 975 MILLIGRAM(S): at 09:01

## 2023-07-28 RX ADMIN — Medication 600 MILLIGRAM(S): at 06:24

## 2023-07-28 RX ADMIN — SENNA PLUS 2 TABLET(S): 8.6 TABLET ORAL at 21:54

## 2023-07-28 RX ADMIN — Medication 500 MILLIGRAM(S): at 12:23

## 2023-07-28 RX ADMIN — Medication 600 MILLIGRAM(S): at 18:30

## 2023-07-28 NOTE — PROGRESS NOTE ADULT - ATTENDING COMMENTS
Patient with poorly controlled GDMA2, signs of fetopathy (macrosomia) and now progressive oligohydramnios (ruled out for PPROM) likely due to placental insufficiency in addition to decreased fetal movement.  Reviewed with the patient that considering all of above risk of stillbirth with continued pregnancy outweighs the small  risk of later  delivery.  Reviewed recommendation for delivery.  Patient verbalized agreement and understanding of the plan.
Pt seen and examined and agree with above  Pt c/o numbess lateral thigh R>L, may be pregnancy related  MRI per neuro  appreciate recs  will continue to follow  d/c planning  L MD Jeffrey
Pt seen and examined and agree with above assessment and plan.  For MFM eval.

## 2023-07-28 NOTE — PROGRESS NOTE ADULT - ASSESSMENT
Patient is a 27 y/o  PPD#2 from  c/b shoulder, sPEC and A2. Patient received epidural for . Patient c/o B/L lower extremity numbness.     #B/L LE numbness  - Per neuro, f/u with anesthesia team to see if thoracic or cervical MRI is necessary   - f/u on MRI lumbar spine     #Postpartum state  - Continue with po analgesia  - Increase ambulation  - Continue regular diet  - IV lock  - No labs    Vanessa Martin  PGY-1

## 2023-07-28 NOTE — PROGRESS NOTE ADULT - SUBJECTIVE AND OBJECTIVE BOX
R1 PPD#2   Progress Note    Patient is a 27 y/o  PPD#2 from  c/b shoulder, sPEC and A2. Patient received epidural for . Patient seen and examined at bedside, no acute overnight events.  Patient still endorses B/L lower extremity numbness of unknown timeline. 9/10 numbness on RLE, 5/10 numbness on LLE. Denies tingling, burning or pain. Patient also c/o swelling on B/L LE. Patient is able to ambulate. Pain well controlled. Patient is ambulating, voiding spontaneously, passing gas, and tolerating regular diet. Denies CP, SOB, N/V, HA, blurred vision, epigastric pain. Bleeding minimal.    Vital Signs Last 24 Hours  T(C): 36.6 (23 @ 05:55), Max: 37.1 (23 @ 18:41)  HR: 96 (23 @ 05:55) (95 - 107)  BP: 124/64 (23 @ 05:55) (117/70 - 129/82)  RR: 17 (23 @ 05:55) (16 - 18)  SpO2: 95% (23 @ 05:55) (95% - 100%)    Physical Exam:  General: NAD  Abdomen: Soft, non-tender, non-distended, fundus firm  Pelvic: Lochia wnl  Ext: 5/5 strength B/L LE, +1 edema on B/L LE, intact pinprick sensation in B/L LE (R<L)    Labs:    Blood Type: A Positive  Antibody Screen: Negative  RPR: Negative               8.4    21.72 )-----------( 293      (  @ 06:35 )             26.0                10.2   29.05 )-----------( 319      (  @ 09:50 )             31.4                11.1   20.72 )-----------( 322      (  @ 04:58 )             34.7         MEDICATIONS  (STANDING):  acetaminophen     Tablet .. 975 milliGRAM(s) Oral <User Schedule>  ascorbic acid 500 milliGRAM(s) Oral daily  diphtheria/tetanus/pertussis (acellular) Vaccine (Adacel) 0.5 milliLiter(s) IntraMuscular once  ferrous    sulfate 325 milliGRAM(s) Oral daily  ibuprofen  Tablet. 600 milliGRAM(s) Oral every 6 hours  NIFEdipine XL 30 milliGRAM(s) Oral daily  oxytocin Infusion 333.333 milliUNIT(s)/Min (1000 mL/Hr) IV Continuous <Continuous>  prenatal multivitamin 1 Tablet(s) Oral daily  senna 2 Tablet(s) Oral at bedtime  sodium chloride 0.9% lock flush 3 milliLiter(s) IV Push every 8 hours    MEDICATIONS  (PRN):  benzocaine 20%/menthol 0.5% Spray 1 Spray(s) Topical every 6 hours PRN for Perineal discomfort  dibucaine 1% Ointment 1 Application(s) Topical every 6 hours PRN Perineal discomfort  diphenhydrAMINE 25 milliGRAM(s) Oral every 6 hours PRN Pruritus  hydrocortisone 1% Cream 1 Application(s) Topical every 6 hours PRN Moderate Pain (4-6)  lanolin Ointment 1 Application(s) Topical every 6 hours PRN nipple soreness  magnesium hydroxide Suspension 30 milliLiter(s) Oral two times a day PRN Constipation  oxyCODONE    IR 5 milliGRAM(s) Oral every 3 hours PRN Moderate to Severe Pain (4-10)  oxyCODONE    IR 5 milliGRAM(s) Oral once PRN Moderate to Severe Pain (4-10)  pramoxine 1%/zinc 5% Cream 1 Application(s) Topical every 4 hours PRN Moderate Pain (4-6)  simethicone 80 milliGRAM(s) Chew every 4 hours PRN Gas  witch hazel Pads 1 Application(s) Topical every 4 hours PRN Perineal discomfort

## 2023-07-29 VITALS
SYSTOLIC BLOOD PRESSURE: 136 MMHG | RESPIRATION RATE: 17 BRPM | DIASTOLIC BLOOD PRESSURE: 81 MMHG | TEMPERATURE: 98 F | HEART RATE: 100 BPM | OXYGEN SATURATION: 100 %

## 2023-07-29 RX ORDER — NIFEDIPINE 30 MG
1 TABLET, EXTENDED RELEASE 24 HR ORAL
Qty: 0 | Refills: 0 | DISCHARGE
Start: 2023-07-29

## 2023-07-29 RX ADMIN — Medication 600 MILLIGRAM(S): at 06:46

## 2023-07-29 RX ADMIN — Medication 600 MILLIGRAM(S): at 12:30

## 2023-07-29 RX ADMIN — Medication 500 MILLIGRAM(S): at 11:39

## 2023-07-29 RX ADMIN — Medication 325 MILLIGRAM(S): at 11:39

## 2023-07-29 RX ADMIN — Medication 600 MILLIGRAM(S): at 11:39

## 2023-07-29 RX ADMIN — Medication 600 MILLIGRAM(S): at 05:56

## 2023-07-29 RX ADMIN — Medication 1 TABLET(S): at 11:38

## 2023-07-29 RX ADMIN — Medication 30 MILLIGRAM(S): at 05:57

## 2023-07-29 NOTE — PROGRESS NOTE ADULT - SUBJECTIVE AND OBJECTIVE BOX
OB Progress Note:  PPD#4    S: Patient is a 27 y/o  PPD#2 from  c/b shoulder, sPEC and A2. Patient received epidural for . Patient feels well today. She says that her lower extremity numbness is improving, and she is gaining back sensation in both legs. She also tells me that she was never truly numb and could always feel pressure and touch, but just not at the full normal level of sensation. Pain is well controlled. She is tolerating a regular diet and passing flatus. She is voiding spontaneously, and ambulating without difficulty. Denies CP/SOB. Denies lightheadedness/dizziness. Denies N/V.    O:  Vitals:   Vital Signs Last 24 Hrs  T(C): 36.4 (2023 10:08), Max: 36.8 (2023 17:57)  T(F): 97.6 (2023 10:08), Max: 98.3 (2023 17:57)  HR: 102 (2023 10:08) (90 - 106)  BP: 118/69 (2023 10:08) (117/77 - 130/76)  BP(mean): --  RR: 16 (2023 10:08) (16 - 18)  SpO2: 100% (2023 10:08) (98% - 100%)    Parameters below as of 2023 10:08  Patient On (Oxygen Delivery Method): room air        MEDICATIONS  (STANDING):  acetaminophen     Tablet .. 975 milliGRAM(s) Oral <User Schedule>  ascorbic acid 500 milliGRAM(s) Oral daily  diphtheria/tetanus/pertussis (acellular) Vaccine (Adacel) 0.5 milliLiter(s) IntraMuscular once  ferrous    sulfate 325 milliGRAM(s) Oral daily  ibuprofen  Tablet. 600 milliGRAM(s) Oral every 6 hours  NIFEdipine XL 30 milliGRAM(s) Oral daily  prenatal multivitamin 1 Tablet(s) Oral daily  senna 2 Tablet(s) Oral at bedtime  sodium chloride 0.9% lock flush 3 milliLiter(s) IV Push every 8 hours    MEDICATIONS  (PRN):  benzocaine 20%/menthol 0.5% Spray 1 Spray(s) Topical every 6 hours PRN for Perineal discomfort  dibucaine 1% Ointment 1 Application(s) Topical every 6 hours PRN Perineal discomfort  diphenhydrAMINE 25 milliGRAM(s) Oral every 6 hours PRN Pruritus  hydrocortisone 1% Cream 1 Application(s) Topical every 6 hours PRN Moderate Pain (4-6)  lanolin Ointment 1 Application(s) Topical every 6 hours PRN nipple soreness  magnesium hydroxide Suspension 30 milliLiter(s) Oral two times a day PRN Constipation  oxyCODONE    IR 5 milliGRAM(s) Oral every 3 hours PRN Moderate to Severe Pain (4-10)  oxyCODONE    IR 5 milliGRAM(s) Oral once PRN Moderate to Severe Pain (4-10)  pramoxine 1%/zinc 5% Cream 1 Application(s) Topical every 4 hours PRN Moderate Pain (4-6)  simethicone 80 milliGRAM(s) Chew every 4 hours PRN Gas  witch hazel Pads 1 Application(s) Topical every 4 hours PRN Perineal discomfort      Labs:  Blood type: A Positive  Rubella IgG: RPR: Negative                    Physical Exam:  General: NAD  Abdomen: soft, non-tender, non-distended, fundus firm  Vaginal: Lochia wnl  Extremities: No erythema/edema

## 2023-07-29 NOTE — PROGRESS NOTE ADULT - ASSESSMENT
A/P: Patient is a 29 y/o  PPD#2 from  c/b shoulder, sPEC and A2. Patient received epidural for  and was c/o B/L lower extremity numbness, which is resolving as of today.    #sPEC  - on Pro30, s/p Lab20, Mg (-)  - pressures stable overnight     #bilat LLE numbness  - per neuro: MRI within normal limits, f/u with anesthesia  - per anesthesia: discussed on the phone that it is unlikely to be a prolonged effect from an epidural, and that if neuro is okay with discharge then they are too. To be seen today by team for final assessment prior to discharge    #postpartum  - Pain well controlled, continue current pain regimen  - Increase ambulation, SCDs when not ambulating  - Continue regular diet  - Discharge planning per private attending    Francisco Soares PGY1

## 2023-07-29 NOTE — PROGRESS NOTE ADULT - SUBJECTIVE AND OBJECTIVE BOX
Pt seen and examined this afternoon. Pt reports "Very mild" bilateral lateral paresthesia that has been improving daily, now states she is essentially back to baseline. No LE weakness, no urinary incontinence. Neuro note reviewed, MRI unremarkable. Counseled pt to come back for any concerning symptoms. No contraindications to discharge from anesthesia point of view.

## 2023-07-30 ENCOUNTER — NON-APPOINTMENT (OUTPATIENT)
Age: 28
End: 2023-07-30

## 2023-07-31 ENCOUNTER — NON-APPOINTMENT (OUTPATIENT)
Age: 28
End: 2023-07-31

## 2023-07-31 RX ORDER — BLOOD SUGAR DIAGNOSTIC
STRIP MISCELLANEOUS 4 TIMES DAILY
Qty: 2 | Refills: 2 | Status: DISCONTINUED | COMMUNITY
Start: 2023-01-18 | End: 2023-07-31

## 2023-07-31 RX ORDER — ISOPROPYL ALCOHOL 0.7 ML/ML
SWAB TOPICAL
Qty: 4 | Refills: 2 | Status: DISCONTINUED | COMMUNITY
Start: 2023-06-06 | End: 2023-07-31

## 2023-07-31 RX ORDER — BLOOD SUGAR DIAGNOSTIC
STRIP MISCELLANEOUS
Qty: 2 | Refills: 2 | Status: DISCONTINUED | COMMUNITY
Start: 2023-06-06 | End: 2023-07-31

## 2023-07-31 RX ORDER — ELECTROLYTES/DEXTROSE
32G X 4 MM SOLUTION, ORAL ORAL
Qty: 120 | Refills: 3 | Status: DISCONTINUED | COMMUNITY
Start: 2023-06-06 | End: 2023-07-31

## 2023-07-31 RX ORDER — BLOOD-GLUCOSE METER
W/DEVICE KIT MISCELLANEOUS
Qty: 1 | Refills: 0 | Status: DISCONTINUED | COMMUNITY
Start: 2023-01-18 | End: 2023-07-31

## 2023-07-31 RX ORDER — METFORMIN HYDROCHLORIDE 1000 MG/1
1000 TABLET, COATED ORAL
Qty: 30 | Refills: 3 | Status: DISCONTINUED | COMMUNITY
Start: 2023-02-01 | End: 2023-07-31

## 2023-07-31 RX ORDER — CHLORHEXIDINE GLUCONATE 4 %
400 LIQUID (ML) TOPICAL DAILY
Qty: 30 | Refills: 8 | Status: DISCONTINUED | COMMUNITY
Start: 2023-01-03 | End: 2023-07-31

## 2023-07-31 RX ORDER — ISOPROPYL ALCOHOL 0.7 ML/ML
SWAB TOPICAL
Qty: 2 | Refills: 3 | Status: DISCONTINUED | COMMUNITY
Start: 2023-01-18 | End: 2023-07-31

## 2023-07-31 RX ORDER — PRENATAL VIT NO.126/IRON/FOLIC 28MG-0.8MG
28-0.8 TABLET ORAL DAILY
Refills: 0 | Status: ACTIVE | COMMUNITY
Start: 2023-07-31

## 2023-07-31 RX ORDER — LANCETS 28 GAUGE
EACH MISCELLANEOUS
Qty: 1 | Refills: 2 | Status: DISCONTINUED | COMMUNITY
Start: 2023-01-18 | End: 2023-07-31

## 2023-07-31 RX ORDER — ASPIRIN 81 MG/1
81 TABLET, DELAYED RELEASE ORAL
Qty: 50 | Refills: 8 | Status: DISCONTINUED | COMMUNITY
Start: 2023-03-08 | End: 2023-07-31

## 2023-07-31 RX ORDER — INSULIN DETEMIR 100 [IU]/ML
100 INJECTION, SOLUTION SUBCUTANEOUS
Qty: 2 | Refills: 0 | Status: DISCONTINUED | COMMUNITY
Start: 2023-04-12 | End: 2023-07-31

## 2023-07-31 RX ORDER — INSULIN LISPRO 100 [IU]/ML
100 INJECTION, SOLUTION INTRAVENOUS; SUBCUTANEOUS
Qty: 1 | Refills: 3 | Status: DISCONTINUED | COMMUNITY
Start: 2023-06-06 | End: 2023-07-31

## 2023-07-31 RX ORDER — MULTIVIT-MIN/FOLIC/VIT K/LYCOP 400-300MCG
500 TABLET ORAL DAILY
Refills: 0 | Status: ACTIVE | COMMUNITY
Start: 2023-07-31

## 2023-07-31 RX ORDER — INSULIN DETEMIR 100 [IU]/ML
100 INJECTION, SOLUTION SUBCUTANEOUS AT BEDTIME
Qty: 2 | Refills: 2 | Status: DISCONTINUED | COMMUNITY
Start: 2023-03-08 | End: 2023-07-31

## 2023-07-31 RX ORDER — LANCETS 28 GAUGE
EACH MISCELLANEOUS
Qty: 2 | Refills: 2 | Status: DISCONTINUED | COMMUNITY
Start: 2023-06-06 | End: 2023-07-31

## 2023-07-31 RX ORDER — INSULIN DETEMIR 100 [IU]/ML
100 INJECTION, SOLUTION SUBCUTANEOUS
Qty: 1 | Refills: 3 | Status: DISCONTINUED | COMMUNITY
Start: 2023-06-06 | End: 2023-07-31

## 2023-08-01 ENCOUNTER — APPOINTMENT (OUTPATIENT)
Dept: OBGYN | Facility: CLINIC | Age: 28
End: 2023-08-01
Payer: COMMERCIAL

## 2023-08-01 VITALS — SYSTOLIC BLOOD PRESSURE: 123 MMHG | BODY MASS INDEX: 40.24 KG/M2 | WEIGHT: 220 LBS | DIASTOLIC BLOOD PRESSURE: 85 MMHG

## 2023-08-01 PROCEDURE — 99213 OFFICE O/P EST LOW 20 MIN: CPT

## 2023-08-01 RX ORDER — FERROUS SULFATE 325(65) MG
325 (65 FE) TABLET ORAL
Qty: 30 | Refills: 3 | Status: ACTIVE | COMMUNITY
Start: 2023-08-01 | End: 1900-01-01

## 2023-08-02 ENCOUNTER — APPOINTMENT (OUTPATIENT)
Dept: CARE COORDINATION | Facility: HOME HEALTH | Age: 28
End: 2023-08-02
Payer: COMMERCIAL

## 2023-08-02 DIAGNOSIS — E66.01 MORBID (SEVERE) OBESITY DUE TO EXCESS CALORIES: ICD-10-CM

## 2023-08-02 PROCEDURE — 99350 HOME/RES VST EST HIGH MDM 60: CPT | Mod: 95

## 2023-08-02 PROCEDURE — 96127 BRIEF EMOTIONAL/BEHAV ASSMT: CPT | Mod: 95

## 2023-08-02 RX ORDER — FERROUS SULFATE TAB EC 325 MG (65 MG FE EQUIVALENT) 325 (65 FE) MG
325 (65 FE) TABLET DELAYED RESPONSE ORAL DAILY
Refills: 0 | Status: DISCONTINUED | COMMUNITY
Start: 2023-07-31 | End: 2023-08-02

## 2023-08-02 RX ORDER — NIFEDIPINE 30 MG/1
30 TABLET, FILM COATED, EXTENDED RELEASE ORAL
Qty: 30 | Refills: 0 | Status: ACTIVE | COMMUNITY
Start: 2023-08-02

## 2023-08-02 NOTE — HISTORY OF PRESENT ILLNESS
[Complications:___] : complications include: [unfilled] [Delivery Date: ___] : on [unfilled] [] : delivered by vaginal delivery [Female] : Delivery History: baby girl [Wt. ___] : weighing [unfilled] [Breastfeeding] : currently nursing [BF with Difficulty] : nursing without difficulty [None] : The patient is currently asymptomatic [Doing Well] : is doing well [No Sign of Infection] : is showing no signs of infection [Excellent Pain Control] : has excellent pain control [FreeTextEntry8] : B/P check s/p  2023 delivery complicated by PEC and right sided numbness. Patient was given Procardia but has not taken it since she has gotten home as her B/P were normal 120/80's. Patient denies S/S of PEC  [de-identified] : B/P WNL in office and at home, denies s/s of PEC  [de-identified] : Continue to monitor B/P BID, if B/P starts to increase advised to take Procardia. patient has appointment to f/u with cardiologist. f/u for PP in 5 weeks

## 2023-08-08 ENCOUNTER — NON-APPOINTMENT (OUTPATIENT)
Age: 28
End: 2023-08-08

## 2023-08-15 ENCOUNTER — NON-APPOINTMENT (OUTPATIENT)
Age: 28
End: 2023-08-15

## 2023-08-22 ENCOUNTER — APPOINTMENT (OUTPATIENT)
Dept: CARDIOLOGY | Facility: CLINIC | Age: 28
End: 2023-08-22
Payer: COMMERCIAL

## 2023-08-22 ENCOUNTER — NON-APPOINTMENT (OUTPATIENT)
Age: 28
End: 2023-08-22

## 2023-08-22 VITALS
BODY MASS INDEX: 40.48 KG/M2 | OXYGEN SATURATION: 99 % | DIASTOLIC BLOOD PRESSURE: 87 MMHG | WEIGHT: 220 LBS | SYSTOLIC BLOOD PRESSURE: 121 MMHG | HEIGHT: 62 IN | HEART RATE: 91 BPM

## 2023-08-22 DIAGNOSIS — O14.90 UNSPECIFIED PRE-ECLAMPSIA, UNSPECIFIED TRIMESTER: ICD-10-CM

## 2023-08-22 PROCEDURE — 93000 ELECTROCARDIOGRAM COMPLETE: CPT

## 2023-08-22 PROCEDURE — 99213 OFFICE O/P EST LOW 20 MIN: CPT | Mod: 25

## 2023-08-22 NOTE — DISCUSSION/SUMMARY
[FreeTextEntry1] : 28 year old woman  who delivered at 36.5 weeks on 23 with course complicated by PEC. She was given Mg post delivery and sent home with meds but never took. Checked BP for a couple of weeks but not since that time. BP now normal FU as needed [EKG obtained to assist in diagnosis and management of assessed problem(s)] : EKG obtained to assist in diagnosis and management of assessed problem(s)

## 2023-08-22 NOTE — HISTORY OF PRESENT ILLNESS
[FreeTextEntry1] : 28 year old woman  who delivered at 36.5 weeks on 23 with course complicated by PEC. She was given Mg post delivery and sent home with meds but never took. Checked BP for a couple of weeks but not since that time. No PMH Mom with HTN

## 2023-08-24 ENCOUNTER — NON-APPOINTMENT (OUTPATIENT)
Age: 28
End: 2023-08-24

## 2023-09-07 ENCOUNTER — APPOINTMENT (OUTPATIENT)
Dept: OBGYN | Facility: CLINIC | Age: 28
End: 2023-09-07
Payer: COMMERCIAL

## 2023-09-07 VITALS — SYSTOLIC BLOOD PRESSURE: 140 MMHG | WEIGHT: 224 LBS | DIASTOLIC BLOOD PRESSURE: 87 MMHG | BODY MASS INDEX: 40.97 KG/M2

## 2023-09-07 PROCEDURE — 0503F POSTPARTUM CARE VISIT: CPT

## 2023-09-07 NOTE — HISTORY OF PRESENT ILLNESS
[Postpartum Follow Up] : postpartum follow up [Complications:___] : complications include: [unfilled] [] : delivered by vaginal delivery [Female] : Delivery History: baby girl [Wt. ___] : weighing [unfilled] [Breastfeeding] : currently nursing [BF with Difficulty] : nursing without difficulty [Resumed Menses] : has resumed her menses [Resumed Stearns] : has resumed intercourse [Intended Contraception] : the patient does not intended to use contraception postpartum [Back to Normal] : is back to normal in size [Mild] : mild vaginal bleeding [Normal] : the vagina was normal [Examination Of The Breasts] : breasts are normal [Doing Well] : is doing well [No Sign of Infection] : is showing no signs of infection [Excellent Pain Control] : has excellent pain control [Regressing] : is regressing [Fair Pain Control] : has fair pain control [None] : None [FreeTextEntry8] : 6wk PPV, pt currently on menses  [de-identified] : Doing well  [FreeTextEntry1] : 28 year old F presenting for 6 week PP visit. -normal PP exam -patient cleared to resume normal activity, including intercourse and exercise -patient counseled on contraception options, declines at this time  -recommended diet and exercise  -f/u for 3 months for annual

## 2023-10-10 ENCOUNTER — APPOINTMENT (OUTPATIENT)
Dept: OBGYN | Facility: CLINIC | Age: 28
End: 2023-10-10
Payer: COMMERCIAL

## 2023-10-10 VITALS
WEIGHT: 227 LBS | SYSTOLIC BLOOD PRESSURE: 126 MMHG | HEIGHT: 62 IN | BODY MASS INDEX: 41.77 KG/M2 | DIASTOLIC BLOOD PRESSURE: 83 MMHG

## 2023-10-10 DIAGNOSIS — N91.2 AMENORRHEA, UNSPECIFIED: ICD-10-CM

## 2023-10-10 PROCEDURE — 36415 COLL VENOUS BLD VENIPUNCTURE: CPT

## 2023-10-10 PROCEDURE — 99213 OFFICE O/P EST LOW 20 MIN: CPT

## 2023-10-12 ENCOUNTER — NON-APPOINTMENT (OUTPATIENT)
Age: 28
End: 2023-10-12

## 2023-10-12 LAB
ABO + RH PNL BLD: NORMAL
BASOPHILS # BLD AUTO: 0.04 K/UL
BASOPHILS NFR BLD AUTO: 0.5 %
BLD GP AB SCN SERPL QL: NORMAL
C TRACH RRNA SPEC QL NAA+PROBE: NOT DETECTED
EOSINOPHIL # BLD AUTO: 0.29 K/UL
EOSINOPHIL NFR BLD AUTO: 3.5 %
ESTIMATED AVERAGE GLUCOSE: 117 MG/DL
GLUCOSE SERPL-MCNC: 98 MG/DL
HBA1C MFR BLD HPLC: 5.7 %
HBV SURFACE AG SER QL: NONREACTIVE
HCG SERPL-MCNC: 865 MIU/ML
HCT VFR BLD CALC: 38.3 %
HCV AB SER QL: NONREACTIVE
HCV S/CO RATIO: 0.13 S/CO
HGB BLD-MCNC: 11.6 G/DL
HIV1+2 AB SPEC QL IA.RAPID: NONREACTIVE
IMM GRANULOCYTES NFR BLD AUTO: 0.2 %
LEAD BLD-MCNC: <1 UG/DL
LYMPHOCYTES # BLD AUTO: 4.16 K/UL
LYMPHOCYTES NFR BLD AUTO: 49.6 %
MAN DIFF?: NORMAL
MCHC RBC-ENTMCNC: 25.1 PG
MCHC RBC-ENTMCNC: 30.3 GM/DL
MCV RBC AUTO: 82.7 FL
MEV IGG FLD QL IA: 16.5 AU/ML
MEV IGG+IGM SER-IMP: POSITIVE
MONOCYTES # BLD AUTO: 0.59 K/UL
MONOCYTES NFR BLD AUTO: 7 %
MUV AB SER-ACNC: POSITIVE
MUV IGG SER QL IA: 60.9 AU/ML
N GONORRHOEA RRNA SPEC QL NAA+PROBE: NOT DETECTED
NEUTROPHILS # BLD AUTO: 3.28 K/UL
NEUTROPHILS NFR BLD AUTO: 39.2 %
PLATELET # BLD AUTO: 488 K/UL
RBC # BLD: 4.63 M/UL
RBC # FLD: 14.2 %
RUBV IGG FLD-ACNC: 7.6 INDEX
RUBV IGG SER-IMP: POSITIVE
SOURCE AMPLIFICATION: NORMAL
WBC # FLD AUTO: 8.38 K/UL

## 2023-10-14 LAB
APPEARANCE: CLEAR
BACTERIA UR CULT: NORMAL
BACTERIA: NEGATIVE /HPF
BILIRUBIN URINE: NEGATIVE
BLOOD URINE: NEGATIVE
CAST: 1 /LPF
COLOR: YELLOW
EPITHELIAL CELLS: 6 /HPF
GLUCOSE QUALITATIVE U: NEGATIVE MG/DL
KETONES URINE: ABNORMAL MG/DL
LEUKOCYTE ESTERASE URINE: NEGATIVE
M TB IFN-G BLD-IMP: NEGATIVE
MICROSCOPIC-UA: NORMAL
NITRITE URINE: NEGATIVE
PH URINE: 6
PROTEIN URINE: NEGATIVE MG/DL
QUANTIFERON TB PLUS MITOGEN MINUS NIL: 8.94 IU/ML
QUANTIFERON TB PLUS NIL: 0.04 IU/ML
QUANTIFERON TB PLUS TB1 MINUS NIL: -0.01 IU/ML
QUANTIFERON TB PLUS TB2 MINUS NIL: -0.01 IU/ML
RED BLOOD CELLS URINE: 2 /HPF
SPECIFIC GRAVITY URINE: >1.03
UROBILINOGEN URINE: 0.2 MG/DL
WHITE BLOOD CELLS URINE: 0 /HPF

## 2023-10-23 NOTE — CONSULT NOTE ADULT - CONSULT REQUESTED BY NAME
OB/GYN Topical Clindamycin Counseling: Patient counseled that this medication may cause skin irritation or allergic reactions.  In the event of skin irritation, the patient was advised to reduce the amount of the drug applied or use it less frequently.   The patient verbalized understanding of the proper use and possible adverse effects of clindamycin.  All of the patient's questions and concerns were addressed.

## 2023-10-30 ENCOUNTER — APPOINTMENT (OUTPATIENT)
Dept: ANTEPARTUM | Facility: CLINIC | Age: 28
End: 2023-10-30
Payer: COMMERCIAL

## 2023-10-30 ENCOUNTER — ASOB RESULT (OUTPATIENT)
Age: 28
End: 2023-10-30

## 2023-10-30 ENCOUNTER — APPOINTMENT (OUTPATIENT)
Dept: OBGYN | Facility: CLINIC | Age: 28
End: 2023-10-30
Payer: COMMERCIAL

## 2023-10-30 VITALS — SYSTOLIC BLOOD PRESSURE: 119 MMHG | DIASTOLIC BLOOD PRESSURE: 74 MMHG | WEIGHT: 227 LBS | BODY MASS INDEX: 41.52 KG/M2

## 2023-10-30 PROCEDURE — 76801 OB US < 14 WKS SINGLE FETUS: CPT

## 2023-10-30 PROCEDURE — 0502F SUBSEQUENT PRENATAL CARE: CPT

## 2023-11-09 LAB — T PALLIDUM AB SER QL IA: NEGATIVE

## 2023-11-09 RX ORDER — LANCETS
EACH MISCELLANEOUS
Qty: 2 | Refills: 2 | Status: ACTIVE | COMMUNITY
Start: 2023-11-09 | End: 1900-01-01

## 2023-11-09 RX ORDER — BLOOD SUGAR DIAGNOSTIC
STRIP MISCELLANEOUS 3 TIMES DAILY
Qty: 60 | Refills: 2 | Status: ACTIVE | COMMUNITY
Start: 2023-11-09 | End: 1900-01-01

## 2023-11-09 RX ORDER — BLOOD-GLUCOSE METER
W/DEVICE KIT MISCELLANEOUS
Qty: 1 | Refills: 0 | Status: ACTIVE | COMMUNITY
Start: 2023-11-09 | End: 1900-01-01

## 2023-11-17 ENCOUNTER — APPOINTMENT (OUTPATIENT)
Dept: OBGYN | Facility: CLINIC | Age: 28
End: 2023-11-17
Payer: COMMERCIAL

## 2023-11-17 ENCOUNTER — NON-APPOINTMENT (OUTPATIENT)
Age: 28
End: 2023-11-17

## 2023-11-17 VITALS — SYSTOLIC BLOOD PRESSURE: 106 MMHG | DIASTOLIC BLOOD PRESSURE: 66 MMHG | WEIGHT: 234 LBS | BODY MASS INDEX: 42.8 KG/M2

## 2023-11-17 PROCEDURE — 0502F SUBSEQUENT PRENATAL CARE: CPT

## 2023-11-27 NOTE — OB RN PATIENT PROFILE - SURGICAL SITE INCISION
no Detail Level: Detailed Quality 226: Preventive Care And Screening: Tobacco Use: Screening And Cessation Intervention: Patient screened for tobacco use and is an ex/non-smoker Quality 130: Documentation Of Current Medications In The Medical Record: Current Medications Documented Quality 431: Preventive Care And Screening: Unhealthy Alcohol Use - Screening: Patient not identified as an unhealthy alcohol user when screened for unhealthy alcohol use using a systematic screening method

## 2023-12-06 ENCOUNTER — ASOB RESULT (OUTPATIENT)
Age: 28
End: 2023-12-06

## 2023-12-06 ENCOUNTER — APPOINTMENT (OUTPATIENT)
Dept: ANTEPARTUM | Facility: CLINIC | Age: 28
End: 2023-12-06
Payer: COMMERCIAL

## 2023-12-06 ENCOUNTER — APPOINTMENT (OUTPATIENT)
Dept: OBGYN | Facility: CLINIC | Age: 28
End: 2023-12-06
Payer: COMMERCIAL

## 2023-12-06 VITALS — SYSTOLIC BLOOD PRESSURE: 138 MMHG | WEIGHT: 229 LBS | BODY MASS INDEX: 41.88 KG/M2 | DIASTOLIC BLOOD PRESSURE: 84 MMHG

## 2023-12-06 PROCEDURE — 0502F SUBSEQUENT PRENATAL CARE: CPT

## 2023-12-06 PROCEDURE — 76801 OB US < 14 WKS SINGLE FETUS: CPT | Mod: 59

## 2023-12-06 PROCEDURE — 76813 OB US NUCHAL MEAS 1 GEST: CPT

## 2023-12-06 PROCEDURE — 36415 COLL VENOUS BLD VENIPUNCTURE: CPT

## 2023-12-11 LAB
ADDITIONAL US: NORMAL
CRL SCAN TWIN B: NORMAL
CRL SCAN: NORMAL
CROWN RUMP LENGTH TWIN B: NORMAL
CROWN RUMP LENGTH: 65 MM
DIA MOM: 0.78
DIA VALUE: 135.8 PG/ML
DOWN SYNDROME AGE RISK: NORMAL
DOWN SYNDROME INTERPRETATION: NORMAL
DOWN SYNDROME SCREENING RISK: NORMAL
FIRST TRIMESTER SCREEN COMMENTS: NORMAL
FIRST TRIMESTER SCREEN NOTE: NORMAL
FIRST TRIMESTER SCREEN RESULTS: NORMAL
FIRST TRIMESTER SCREEN TEST RESULTS: NORMAL
GEST. AGE ON COLLECTION DATE: 12.7 WEEKS
HCG MOM: 0.3
HCG VALUE: 22 IU/ML
MATERNAL AGE AT EDD: 30.1 YR
NT MOM TWIN B: NORMAL
NT TWIN B: NORMAL
NUCHAL TRANSLUCENCY (NT): 1.6 MM
NUCHAL TRANSLUCENCY MOM: 0.95
NUMBER OF FETUSES: 1
PAPP-A MOM: 0.2
PAPP-A VALUE: 131 NG/ML
RACE: NORMAL
SONOGRAPHER ID#: NORMAL
TRISOMY 18 AGE RISK: NORMAL
TRISOMY 18 INTERPRETATION: NORMAL
TRISOMY 18 SCREENING RISK: NORMAL
WEIGHT AFP: 229 LBS

## 2023-12-19 ENCOUNTER — APPOINTMENT (OUTPATIENT)
Dept: OBGYN | Facility: CLINIC | Age: 28
End: 2023-12-19

## 2024-01-03 ENCOUNTER — APPOINTMENT (OUTPATIENT)
Dept: OBGYN | Facility: CLINIC | Age: 29
End: 2024-01-03
Payer: COMMERCIAL

## 2024-01-03 VITALS — DIASTOLIC BLOOD PRESSURE: 85 MMHG | BODY MASS INDEX: 42.43 KG/M2 | SYSTOLIC BLOOD PRESSURE: 123 MMHG | WEIGHT: 232 LBS

## 2024-01-03 DIAGNOSIS — Z34.90 ENCOUNTER FOR SUPERVISION OF NORMAL PREGNANCY, UNSPECIFIED, UNSPECIFIED TRIMESTER: ICD-10-CM

## 2024-01-03 PROCEDURE — 36415 COLL VENOUS BLD VENIPUNCTURE: CPT

## 2024-01-03 PROCEDURE — 0502F SUBSEQUENT PRENATAL CARE: CPT

## 2024-01-11 LAB
AFP MOM: 0.6
AFP VALUE: 16.4 NG/ML
ALPHA FETOPROTEIN SERUM COMMENT: NORMAL
ALPHA FETOPROTEIN SERUM INTERPRETATION: NORMAL
ALPHA FETOPROTEIN SERUM RESULTS: NORMAL
ALPHA FETOPROTEIN SERUM TEST RESULTS: NORMAL
GESTATIONAL AGE BASED ON: NORMAL
GESTATIONAL AGE ON COLLECTION DATE: 16.4 WEEKS
INSULIN DEP DIABETES: NO
MATERNAL AGE AT EDD AFP: 30 YR
MULTIPLE GESTATION: NO
OSBR RISK 1 IN: NORMAL
RACE: NORMAL
WEIGHT AFP: 232 LBS

## 2024-01-17 ENCOUNTER — APPOINTMENT (OUTPATIENT)
Dept: OBGYN | Facility: CLINIC | Age: 29
End: 2024-01-17
Payer: COMMERCIAL

## 2024-01-17 VITALS
BODY MASS INDEX: 43.61 KG/M2 | SYSTOLIC BLOOD PRESSURE: 128 MMHG | WEIGHT: 237 LBS | HEIGHT: 62 IN | DIASTOLIC BLOOD PRESSURE: 82 MMHG

## 2024-01-17 PROCEDURE — 0502F SUBSEQUENT PRENATAL CARE: CPT

## 2024-01-17 RX ORDER — INSULIN LISPRO 100 [IU]/ML
100 INJECTION, SOLUTION INTRAVENOUS; SUBCUTANEOUS
Qty: 2 | Refills: 2 | Status: ACTIVE | COMMUNITY
Start: 2024-01-17 | End: 1900-01-01

## 2024-01-17 RX ORDER — FLASH GLUCOSE SCANNING READER
EACH MISCELLANEOUS
Qty: 1 | Refills: 0 | Status: ACTIVE | COMMUNITY
Start: 2024-01-17 | End: 1900-01-01

## 2024-01-17 RX ORDER — INSULIN DETEMIR 100 [IU]/ML
100 INJECTION, SOLUTION SUBCUTANEOUS
Qty: 2 | Refills: 3 | Status: ACTIVE | COMMUNITY
Start: 2024-01-17 | End: 1900-01-01

## 2024-01-23 LAB
CHROMOSOME13 INTERPRETATION: NORMAL
CHROMOSOME13 TEST RESULT: NORMAL
CHROMOSOME18 INTERPRETATION: NORMAL
CHROMOSOME18 TEST RESULT: NORMAL
CHROMOSOME21 INTERPRETATION: NORMAL
CHROMOSOME21 TEST RESULT: NORMAL
FETAL FRACTION: NORMAL
PERFORMANCE AND LIMITATIONS: NORMAL
SEX CHROMOSOME INTERPRETATION: NORMAL
SEX CHROMOSOME TEST RESULT: NORMAL
VERIFI PRENATAL TEST: NOT DETECTED

## 2024-02-05 ENCOUNTER — APPOINTMENT (OUTPATIENT)
Dept: ANTEPARTUM | Facility: CLINIC | Age: 29
End: 2024-02-05
Payer: COMMERCIAL

## 2024-02-05 ENCOUNTER — APPOINTMENT (OUTPATIENT)
Dept: OBGYN | Facility: CLINIC | Age: 29
End: 2024-02-05
Payer: COMMERCIAL

## 2024-02-05 ENCOUNTER — ASOB RESULT (OUTPATIENT)
Age: 29
End: 2024-02-05

## 2024-02-05 VITALS — SYSTOLIC BLOOD PRESSURE: 114 MMHG | DIASTOLIC BLOOD PRESSURE: 73 MMHG | WEIGHT: 240 LBS | BODY MASS INDEX: 43.9 KG/M2

## 2024-02-05 PROCEDURE — 76811 OB US DETAILED SNGL FETUS: CPT

## 2024-02-05 PROCEDURE — 0502F SUBSEQUENT PRENATAL CARE: CPT

## 2024-02-05 RX ORDER — FLASH GLUCOSE SENSOR
KIT MISCELLANEOUS
Qty: 3 | Refills: 3 | Status: ACTIVE | COMMUNITY
Start: 2024-01-17 | End: 1900-01-01

## 2024-02-21 ENCOUNTER — APPOINTMENT (OUTPATIENT)
Dept: PEDIATRIC CARDIOLOGY | Facility: CLINIC | Age: 29
End: 2024-02-21

## 2024-03-15 ENCOUNTER — APPOINTMENT (OUTPATIENT)
Dept: PEDIATRIC CARDIOLOGY | Facility: CLINIC | Age: 29
End: 2024-03-15
Payer: COMMERCIAL

## 2024-03-15 PROCEDURE — 76825 ECHO EXAM OF FETAL HEART: CPT

## 2024-03-15 PROCEDURE — 76821 MIDDLE CEREBRAL ARTERY ECHO: CPT

## 2024-03-15 PROCEDURE — 76820 UMBILICAL ARTERY ECHO: CPT

## 2024-03-15 PROCEDURE — 93325 DOPPLER ECHO COLOR FLOW MAPG: CPT | Mod: 59

## 2024-03-15 PROCEDURE — 99202 OFFICE O/P NEW SF 15 MIN: CPT | Mod: 25

## 2024-03-15 PROCEDURE — 76827 ECHO EXAM OF FETAL HEART: CPT

## 2024-03-18 ENCOUNTER — APPOINTMENT (OUTPATIENT)
Dept: OBGYN | Facility: CLINIC | Age: 29
End: 2024-03-18
Payer: COMMERCIAL

## 2024-03-18 VITALS
SYSTOLIC BLOOD PRESSURE: 129 MMHG | WEIGHT: 247 LBS | BODY MASS INDEX: 45.45 KG/M2 | HEIGHT: 62 IN | DIASTOLIC BLOOD PRESSURE: 88 MMHG

## 2024-03-18 PROCEDURE — 0502F SUBSEQUENT PRENATAL CARE: CPT

## 2024-04-08 ENCOUNTER — APPOINTMENT (OUTPATIENT)
Dept: ANTEPARTUM | Facility: CLINIC | Age: 29
End: 2024-04-08

## 2024-04-08 ENCOUNTER — APPOINTMENT (OUTPATIENT)
Dept: OBGYN | Facility: CLINIC | Age: 29
End: 2024-04-08

## 2024-10-24 NOTE — OB RN PATIENT PROFILE - TEACHING/LEARNING FACTORS INFLUENCE READINESS TO LEARN
none 23-Oct-2024 23-Oct-2024 23-Oct-2024 23-Oct-2024 30-Oct-2024 23-Oct-2024 23-Oct-2024 30-Oct-2024

## 2024-11-04 NOTE — OB PROVIDER H&P - NSOBPROC_OBGYN_ALL_OB
Pt's mother, Nadege, called to follow up on the completion of this form. She stated that you agreed to complete it asap.    Please advise   None Done

## 2025-02-16 NOTE — OB PROVIDER H&P - ASSESSMENT
show
A/P Pt with IUP at 33+ weeks found to have oligohydramnios in office RUBY = 2 and LGA fetus.  Pt sent in for observation overnight.  Pt in stable condition.    Will:    1) Admit to Antepartum  2) Continuous FHT overnight  3) Repeat Sono in am as per Dr. Burk    Will follow    ANDREW Miller